# Patient Record
Sex: MALE | Race: WHITE | Employment: OTHER | URBAN - METROPOLITAN AREA
[De-identification: names, ages, dates, MRNs, and addresses within clinical notes are randomized per-mention and may not be internally consistent; named-entity substitution may affect disease eponyms.]

---

## 2017-01-09 ENCOUNTER — OFFICE VISIT (OUTPATIENT)
Dept: PHYSICAL THERAPY | Age: 82
End: 2017-01-09
Payer: MEDICARE

## 2017-01-09 ENCOUNTER — TELEPHONE (OUTPATIENT)
Dept: PHYSICAL THERAPY | Age: 82
End: 2017-01-09

## 2017-01-09 DIAGNOSIS — G91.2 NPH (NORMAL PRESSURE HYDROCEPHALUS) (HCC): Primary | ICD-10-CM

## 2017-01-09 PROCEDURE — 97162 PT EVAL MOD COMPLEX 30 MIN: CPT

## 2017-01-09 PROCEDURE — 97110 THERAPEUTIC EXERCISES: CPT

## 2017-01-09 NOTE — PROGRESS NOTES
FALL SCREEN EVALUATION   Referring Physician: Ms. Luis Fernando CORONAN-FNP   Diagnosis: Normal Pressure Hydrocephalus     Date of Service: 1/9/2017     PATIENT SUMMARY   Milton Collazo is a 80year old y/o male who presents to therapy today seeking r bilaterally. No increase in pain. LE Strength: >4+/5 for all planes assessed of the LEs. LE Flexibility: Moderate restrictions of the bilateral hamstrings and iliopsoas.                  Postural Control:  4-Stage Balance Test:   - Feet together: 10 s Able to smoothly change walking speed without loss of balance or gait deviation. Shows a significant difference in walking speed between normal, fast and slow speeds.    (2)  Mild Impairment: Is able to change speed but demonstrates mild gait deviations, or provided on 1/9/2017. Patient was instructed in and issued a HEP for LE strengthening, proprioception, and balance.     Charges: PT Eval x1, TherEx 1       Total Timed Treatment: 45 min     Total Treatment Time: 45 min     PLAN OF CARE:    Goals: (to be met certify the need for these services furnished under this plan of treatment and while under my care.     X___________________________________________________ Date____________________    Certification From: 1/0/5575  To:4/9/2017

## 2017-01-09 NOTE — PROGRESS NOTES
HPI:    Patient ID: Tianna Prince is a 80year old male.     HPI    Review of Systems         Current Outpatient Prescriptions:  Order #: [id number]   Order #: [id number]   Order #: [id number]   Order #: [id number]   Order #: [id number]   Order #: [id number]   Orde

## 2017-01-16 ENCOUNTER — TELEPHONE (OUTPATIENT)
Dept: NEUROLOGY | Facility: CLINIC | Age: 82
End: 2017-01-16

## 2017-01-16 ENCOUNTER — APPOINTMENT (OUTPATIENT)
Dept: SPEECH THERAPY | Age: 82
End: 2017-01-16
Payer: MEDICARE

## 2017-01-16 ENCOUNTER — APPOINTMENT (OUTPATIENT)
Dept: PHYSICAL THERAPY | Age: 82
End: 2017-01-16
Payer: MEDICARE

## 2017-01-16 NOTE — TELEPHONE ENCOUNTER
Left message for Vinicius Rossidles that patient hasn't been seen since 11/2/15 and would need follow up for new order.

## 2017-01-23 ENCOUNTER — OFFICE VISIT (OUTPATIENT)
Dept: SPEECH THERAPY | Age: 82
End: 2017-01-23
Attending: INTERNAL MEDICINE
Payer: MEDICARE

## 2017-01-23 ENCOUNTER — OFFICE VISIT (OUTPATIENT)
Dept: PHYSICAL THERAPY | Age: 82
End: 2017-01-23
Payer: MEDICARE

## 2017-01-23 DIAGNOSIS — G91.2 NORMAL PRESSURE HYDROCEPHALUS (HCC): Primary | ICD-10-CM

## 2017-01-23 PROCEDURE — 92523 SPEECH SOUND LANG COMPREHEN: CPT

## 2017-01-23 PROCEDURE — 97530 THERAPEUTIC ACTIVITIES: CPT

## 2017-01-23 PROCEDURE — 97116 GAIT TRAINING THERAPY: CPT

## 2017-01-23 PROCEDURE — 97110 THERAPEUTIC EXERCISES: CPT

## 2017-01-23 NOTE — PROGRESS NOTES
Dx: Gait Disturbance secondary Hydrocephalus         Authorized # of Visits:  12         Next MD visit: none scheduled  Fall Risk: standard         Precautions: n/a             Subjective: States he is feeling well. Able to complete most tasks at home.   H Total Timed Treatment: 35 min  Total Treatment Time: 35 min

## 2017-01-30 ENCOUNTER — APPOINTMENT (OUTPATIENT)
Dept: PHYSICAL THERAPY | Age: 82
End: 2017-01-30
Payer: MEDICARE

## 2017-02-01 NOTE — PROGRESS NOTES
ADULT SPEECH/LANGUAGE EVALUATION  Referring Physician: IZABEL Fowler  Diagnosis: Cognitive communication deficit (A44.050) Date of Service: 1/23/2017     PATIENT SUMMARY  Bayron Brown is a 80year old male who presents to therapy toda forgetfulness       Current Outpatient Prescriptions:  Multiple Vitamins-Minerals (PRESERVISION AREDS) Oral Tab Take  by mouth. Disp:  Rfl:    folic acid (FOLVITE) 152 MCG Oral Tab Take 400 mcg by mouth daily.  Disp:  Rfl:    Potassium Chloride ER (K-DUR M2 tools used: Cognitive-Linguistic Quick Test (CLQT)  Severity: Mild-moderate    Attention: 53- Moderate deficits  Memory: 151- WNL  Executive Functions: 12- Moderate deficits   Language: 28- WNL (borderline mild deficits)  Visuospatial Skills: 41- Mild defi organizational tasks with 80% accuracy given min verbal cues. 3. Patient will complete attention (sustained, divided, alternating) tasks with 90% accuracy given min verbal cues.   4, Patient will complete word finding activities (synonyms, opposites, fill-

## 2017-02-06 ENCOUNTER — OFFICE VISIT (OUTPATIENT)
Dept: PHYSICAL THERAPY | Age: 82
End: 2017-02-06
Payer: MEDICARE

## 2017-02-06 ENCOUNTER — OFFICE VISIT (OUTPATIENT)
Dept: SPEECH THERAPY | Age: 82
End: 2017-02-06
Attending: INTERNAL MEDICINE
Payer: MEDICARE

## 2017-02-06 PROCEDURE — 97116 GAIT TRAINING THERAPY: CPT

## 2017-02-06 PROCEDURE — 92507 TX SP LANG VOICE COMM INDIV: CPT

## 2017-02-06 PROCEDURE — 97530 THERAPEUTIC ACTIVITIES: CPT

## 2017-02-06 PROCEDURE — 97110 THERAPEUTIC EXERCISES: CPT

## 2017-02-06 NOTE — PROGRESS NOTES
Treatment # . MEDICARE: 2, 2# since last progress note.  Treatment Time:60 minutes  Precautions: Fall Risk      Charges: 1 billed ([id number])   Pain: 0/10       Diagnosis: Cognitive communication deficit (R41.841)          Subjective: Patient arrived to therapy

## 2017-02-06 NOTE — PROGRESS NOTES
Dx: Gait Disturbance secondary Hydrocephalus         Authorized # of Visits:  12         Next MD visit: none scheduled  Fall Risk: standard         Precautions: n/a             Subjective: States he is feeling well. Able to complete most tasks at home.   H VCs.                  Therapeutic Activity Therapeutic Activity        Tandem walk (HEP) Tandem walk (HEP)        Carioca (HEP) Carioca (HEP)        Sit to Stand x 10 Sit to Stand x 10                                   Skilled Services:  All exercise skille

## 2017-02-09 ENCOUNTER — PRIOR ORIGINAL RECORDS (OUTPATIENT)
Dept: OTHER | Age: 82
End: 2017-02-09

## 2017-02-13 ENCOUNTER — APPOINTMENT (OUTPATIENT)
Dept: SPEECH THERAPY | Age: 82
End: 2017-02-13
Payer: MEDICARE

## 2017-02-13 ENCOUNTER — APPOINTMENT (OUTPATIENT)
Dept: PHYSICAL THERAPY | Age: 82
End: 2017-02-13
Payer: MEDICARE

## 2017-02-20 ENCOUNTER — OFFICE VISIT (OUTPATIENT)
Dept: SPEECH THERAPY | Age: 82
End: 2017-02-20
Attending: INTERNAL MEDICINE
Payer: MEDICARE

## 2017-02-20 ENCOUNTER — OFFICE VISIT (OUTPATIENT)
Dept: PHYSICAL THERAPY | Age: 82
End: 2017-02-20
Payer: MEDICARE

## 2017-02-20 PROCEDURE — 97530 THERAPEUTIC ACTIVITIES: CPT

## 2017-02-20 PROCEDURE — 97110 THERAPEUTIC EXERCISES: CPT

## 2017-02-20 PROCEDURE — 97116 GAIT TRAINING THERAPY: CPT

## 2017-02-20 PROCEDURE — 92507 TX SP LANG VOICE COMM INDIV: CPT

## 2017-02-20 NOTE — PROGRESS NOTES
Dx: Gait Disturbance secondary Hydrocephalus         Authorized # of Visits:  12         Next MD visit: none scheduled  Fall Risk: standard         Precautions: n/a             Subjective: Feeling more stable with his walking and with the initiation of wal terrains with SBA and min VCs. Patient ambulate on various terrains with SBA and min VCs.                  Therapeutic Activity Therapeutic Activity Therapeutic Activity(25 min)       Tandem walk (HEP) Tandem walk (HEP) Tandem walk (HEP)       Carioca (HEP

## 2017-02-21 NOTE — PROGRESS NOTES
Treatment # . MEDICARE: 3, 3# since last progress note.  Treatment Time:60 minutes  Precautions: Fall Risk      Charges: 1 billed ([id number])   Pain: 0/10       Diagnosis: Cognitive communication deficit (R41.841)          Subjective: Patient arrived to therapy

## 2017-02-22 ENCOUNTER — PRIOR ORIGINAL RECORDS (OUTPATIENT)
Dept: OTHER | Age: 82
End: 2017-02-22

## 2017-02-27 ENCOUNTER — APPOINTMENT (OUTPATIENT)
Dept: SPEECH THERAPY | Age: 82
End: 2017-02-27
Payer: MEDICARE

## 2017-02-27 ENCOUNTER — TELEPHONE (OUTPATIENT)
Dept: PHYSICAL THERAPY | Age: 82
End: 2017-02-27

## 2017-02-27 ENCOUNTER — APPOINTMENT (OUTPATIENT)
Dept: PHYSICAL THERAPY | Age: 82
End: 2017-02-27
Payer: MEDICARE

## 2017-03-06 ENCOUNTER — TELEPHONE (OUTPATIENT)
Dept: PHYSICAL THERAPY | Age: 82
End: 2017-03-06

## 2017-03-13 ENCOUNTER — TELEPHONE (OUTPATIENT)
Dept: PHYSICAL THERAPY | Age: 82
End: 2017-03-13

## 2017-03-20 ENCOUNTER — OFFICE VISIT (OUTPATIENT)
Dept: SPEECH THERAPY | Age: 82
End: 2017-03-20
Attending: INTERNAL MEDICINE
Payer: MEDICARE

## 2017-03-20 PROCEDURE — 92507 TX SP LANG VOICE COMM INDIV: CPT

## 2017-03-20 NOTE — PROGRESS NOTES
Treatment # . MEDICARE: 4, 4# since last progress note.  Treatment Time:60 minutes  Precautions: Fall Risk      Charges: 1 billed ([id number])   Pain: 0/10       Diagnosis: Cognitive communication deficit (R41.841)          Subjective: Patient arrived to therapy characterized by decreased attention and reasoning. He continues to struggle as the decreased attention negatively affects his performance with many ADL activities.  He does continues to be able to hold conversation however demonstrates the use of short and

## 2017-03-27 ENCOUNTER — OFFICE VISIT (OUTPATIENT)
Dept: SPEECH THERAPY | Age: 82
End: 2017-03-27
Attending: INTERNAL MEDICINE
Payer: MEDICARE

## 2017-03-27 PROCEDURE — 92507 TX SP LANG VOICE COMM INDIV: CPT

## 2017-03-27 NOTE — PROGRESS NOTES
Treatment # . MEDICARE: 5, 5# since last progress note.  Treatment Time:60 minutes  Precautions: Fall Risk      Charges: 1 billed ([id number])   Pain: 0/10       Diagnosis: Cognitive communication deficit (R41.841)          Subjective: Patient arrived to therapy cues to reinforce the importance of the therapy tasks. He did however, demonstrate improved skills since last week with his pill sorting with regards to reasoning through the required steps to complete the task. Plan: Continue treatment plan.   Facilitat

## 2017-04-03 ENCOUNTER — OFFICE VISIT (OUTPATIENT)
Dept: SPEECH THERAPY | Age: 82
End: 2017-04-03
Attending: INTERNAL MEDICINE
Payer: MEDICARE

## 2017-04-03 PROCEDURE — 92507 TX SP LANG VOICE COMM INDIV: CPT

## 2017-04-03 NOTE — PROGRESS NOTES
Treatment # . MEDICARE: 6, 6# since last progress note.  Treatment Time:60 minutes  Precautions: Fall Risk      Charges: 1 billed ([id number])   Pain: 0/10       Diagnosis: Cognitive communication deficit (R41.841)          Subjective: Patient arrived to therapy Word finding (cross word puzzles): 100% given min verbal cues     4. Patient will compete reasoning and problem solving activities with 90% accuracy given min verbal cues.    Reasoning (pill sorting): 65% patient continues to need mod-max verbal and visu

## 2017-04-10 ENCOUNTER — TELEPHONE (OUTPATIENT)
Dept: PHYSICAL THERAPY | Age: 82
End: 2017-04-10

## 2017-04-10 ENCOUNTER — APPOINTMENT (OUTPATIENT)
Dept: SPEECH THERAPY | Age: 82
End: 2017-04-10
Payer: MEDICARE

## 2017-04-17 ENCOUNTER — OFFICE VISIT (OUTPATIENT)
Dept: SPEECH THERAPY | Age: 82
End: 2017-04-17
Attending: Other
Payer: MEDICARE

## 2017-04-17 PROCEDURE — 92507 TX SP LANG VOICE COMM INDIV: CPT

## 2017-04-17 NOTE — PROGRESS NOTES
Treatment # . MEDICARE: 7, 7# since last progress note.  Treatment Time:60 minutes  Precautions: Fall Risk      Charges: 1 billed ([id number])   Pain: 0/10       Diagnosis: Cognitive communication deficit (R41.841)          Subjective: Patient arrived to therapy present with cognitive communicative deficits characterized by decreased attention and reasoning. Deficits in attention greatly impact his performance. Patient presented with increasedmotivation today which helps to improve his attention to therapy tasks.

## 2017-04-26 ENCOUNTER — PRIOR ORIGINAL RECORDS (OUTPATIENT)
Dept: OTHER | Age: 82
End: 2017-04-26

## 2017-05-05 ENCOUNTER — PRIOR ORIGINAL RECORDS (OUTPATIENT)
Dept: OTHER | Age: 82
End: 2017-05-05

## 2017-08-28 ENCOUNTER — PRIOR ORIGINAL RECORDS (OUTPATIENT)
Dept: OTHER | Age: 82
End: 2017-08-28

## 2017-12-01 ENCOUNTER — PRIOR ORIGINAL RECORDS (OUTPATIENT)
Dept: OTHER | Age: 82
End: 2017-12-01

## 2018-07-19 ENCOUNTER — MYAURORA ACCOUNT LINK (OUTPATIENT)
Dept: OTHER | Age: 83
End: 2018-07-19

## 2018-07-19 ENCOUNTER — PRIOR ORIGINAL RECORDS (OUTPATIENT)
Dept: OTHER | Age: 83
End: 2018-07-19

## 2018-07-25 ENCOUNTER — PRIOR ORIGINAL RECORDS (OUTPATIENT)
Dept: OTHER | Age: 83
End: 2018-07-25

## 2018-07-26 ENCOUNTER — PRIOR ORIGINAL RECORDS (OUTPATIENT)
Dept: OTHER | Age: 83
End: 2018-07-26

## 2018-07-26 LAB
ALBUMIN: 3.9 G/DL
ALKALINE PHOSPHATATE(ALK PHOS): 51 IU/L
BILIRUBIN TOTAL: 0.7 MG/DL
BUN: 15 MG/DL
CALCIUM: 9.2 MG/DL
CHLORIDE: 103 MEQ/L
CHOLESTEROL, TOTAL: 169 MG/DL
CREATININE, SERUM: 1 MG/DL
GLOBULIN: 2.2 G/DL
GLUCOSE: 110 MG/DL
HDL CHOLESTEROL: 45 MG/DL
HEMATOCRIT: 41 %
HEMOGLOBIN: 13.6 G/DL
LDL CHOLESTEROL: 97 MG/DL
PLATELETS: 261 K/UL
POTASSIUM, SERUM: 4.7 MEQ/L
PROTEIN, TOTAL: 6.1 G/DL
RED BLOOD COUNT: 4.48 X 10-6/U
SGOT (AST): 24 IU/L
SGPT (ALT): 25 IU/L
SODIUM: 139 MEQ/L
TRIGLYCERIDES: 168 MG/DL
WHITE BLOOD COUNT: 6.1 X 10-3/U

## 2018-09-17 ENCOUNTER — PRIOR ORIGINAL RECORDS (OUTPATIENT)
Dept: OTHER | Age: 83
End: 2018-09-17

## 2018-09-17 LAB — INR: 2.3

## 2018-10-12 ENCOUNTER — OFFICE VISIT (OUTPATIENT)
Dept: PHYSICAL THERAPY | Age: 83
End: 2018-10-12
Payer: MEDICARE

## 2018-10-12 PROCEDURE — 97161 PT EVAL LOW COMPLEX 20 MIN: CPT

## 2018-10-12 PROCEDURE — 97530 THERAPEUTIC ACTIVITIES: CPT

## 2018-10-12 NOTE — PROGRESS NOTES
FALL SCREEN EVALUATION   Referring Physician: Dr. Cameron Rain. Diagnosis: Gait and Balance Disturbance.       Date of Service: 10/12/2018     PATIENT SUMMARY   Liliana Vergara is a 80year old y/o male who presents to therapy today with complaints of flexibility.                  Postural Control:  4-Stage Balance Test:   - Feet together: 0 sec   - Modified Tandem:  0 sec   - Tandem Stance: 0 sec Fall Risk: yes   - SLS: R 2 sec, L 4 sec Fall Risk: yes  [Full tandem stance <10 sec indicates increased ris speed but demonstrates mild gait deviations, or no gait deviations but unable to achieve a significant change in velocity, or uses an assistive device.    (1)  Moderate Impairment: Makes only minor adjustments to walking speed, or accomplishes a change in s Goals: (to be met in 12 visits)  · Pt will demonstrate improved SLS to >5 seconds DUSTY to promote safety and decrease risk of falls on uneven surfaces such as grass  · Pt will perform TUG in <12 seconds with least restrictive AD, demonstrating improved ga care.    X___________________________________________________ Date____________________    Certification From: 67/09/0369  To:1/10/2019

## 2018-10-15 ENCOUNTER — OFFICE VISIT (OUTPATIENT)
Dept: PHYSICAL THERAPY | Age: 83
End: 2018-10-15
Payer: MEDICARE

## 2018-10-15 DIAGNOSIS — R26.81 GAIT INSTABILITY: ICD-10-CM

## 2018-10-15 PROCEDURE — 97112 NEUROMUSCULAR REEDUCATION: CPT

## 2018-10-15 PROCEDURE — 97110 THERAPEUTIC EXERCISES: CPT

## 2018-10-15 NOTE — PROGRESS NOTES
Dx: Gait Instability         Authorized # of Visits:  12 (Medicare)         Next MD visit: none scheduled  Fall Risk: standard         Precautions: n/a             Subjective: States that he is doing better.       Objective: Treatment initiated per treatmen

## 2018-10-17 ENCOUNTER — PRIOR ORIGINAL RECORDS (OUTPATIENT)
Dept: OTHER | Age: 83
End: 2018-10-17

## 2018-10-17 LAB — INR: 2

## 2018-10-22 ENCOUNTER — OFFICE VISIT (OUTPATIENT)
Dept: PHYSICAL THERAPY | Age: 83
End: 2018-10-22
Attending: Other
Payer: MEDICARE

## 2018-10-22 PROCEDURE — 97112 NEUROMUSCULAR REEDUCATION: CPT

## 2018-10-22 PROCEDURE — 97110 THERAPEUTIC EXERCISES: CPT

## 2018-10-22 NOTE — PROGRESS NOTES
Dx: Gait Instability         Authorized # of Visits:  12 (Medicare)         Next MD visit: none scheduled  Fall Risk: standard         Precautions: n/a             Subjective: States that he is sore today. States that his legs feel tight.       Objective: treatment and progress as tolerated. Charges: TherEx 2 (25 min);  Neuro Rob 1 (15 min)       Total Timed Treatment: 40 min  Total Treatment Time: 40 min

## 2018-10-24 ENCOUNTER — OFFICE VISIT (OUTPATIENT)
Dept: PHYSICAL THERAPY | Age: 83
End: 2018-10-24
Payer: MEDICARE

## 2018-10-24 PROCEDURE — 97112 NEUROMUSCULAR REEDUCATION: CPT

## 2018-10-24 PROCEDURE — 97110 THERAPEUTIC EXERCISES: CPT

## 2018-10-24 NOTE — PROGRESS NOTES
Dx: Gait Instability         Authorized # of Visits:  12 (Medicare)         Next MD visit: none scheduled  Fall Risk: standard         Precautions: n/a             Subjective: States that the exercise is pushing him physically.   Is sore generally and has h strength to demonstrate ability to ascend/descend 1 flight of stairs reciprocally without use of handrail  · Pt will be independent and compliant with comprehensive HEP to maintain progress achieved in PT    Plan: Assess response to treatment and progress

## 2018-10-29 ENCOUNTER — OFFICE VISIT (OUTPATIENT)
Dept: PHYSICAL THERAPY | Age: 83
End: 2018-10-29
Payer: MEDICARE

## 2018-10-29 PROCEDURE — 97112 NEUROMUSCULAR REEDUCATION: CPT

## 2018-10-29 PROCEDURE — 97110 THERAPEUTIC EXERCISES: CPT

## 2018-10-29 NOTE — PROGRESS NOTES
Dx: Gait Instability         Authorized # of Visits:  12 (Medicare)         Next MD visit: none scheduled  Fall Risk: standard         Precautions: n/a             Subjective: States that he has \"a few aches,\" but otherwise good.       Objective: Treatmen will be able to squat to  light objects around the house without loss of stability  · Pt will improve functional hip strength to demonstrate ability to ascend/descend 1 flight of stairs reciprocally without use of handrail  · Pt will be independent

## 2018-10-31 ENCOUNTER — OFFICE VISIT (OUTPATIENT)
Dept: PHYSICAL THERAPY | Age: 83
End: 2018-10-31
Payer: MEDICARE

## 2018-10-31 PROCEDURE — 97110 THERAPEUTIC EXERCISES: CPT

## 2018-10-31 PROCEDURE — 97112 NEUROMUSCULAR REEDUCATION: CPT

## 2018-10-31 NOTE — PROGRESS NOTES
Dx: Gait Instability         Authorized # of Visits:  12 (Medicare)         Next MD visit: none scheduled  Fall Risk: standard         Precautions: n/a             Subjective: Some lumbar pain today. Objective: Treatment progressed per treatment log. community ambulation  · Pt will perform 4-Item DGI with score of 10/12 or greater with least restrictive AD to demonstrate ability to ambulate safely in crowded and busy environments  · Pt will be able to squat to  light objects around the house wit

## 2018-11-06 ENCOUNTER — OFFICE VISIT (OUTPATIENT)
Dept: PHYSICAL THERAPY | Age: 83
End: 2018-11-06
Payer: MEDICARE

## 2018-11-06 PROCEDURE — 97112 NEUROMUSCULAR REEDUCATION: CPT

## 2018-11-06 PROCEDURE — 97110 THERAPEUTIC EXERCISES: CPT

## 2018-11-06 NOTE — PROGRESS NOTES
Dx: Gait Instability         Authorized # of Visits:  12 (Medicare)         Next MD visit: none scheduled  Fall Risk: standard         Precautions: n/a             Subjective: Feeling better today. More mobility since last session.   Feels more stable on h <12 seconds with least restrictive AD, demonstrating improved gait speed for improved participation in ADL such as community ambulation (progressing)  · Pt will perform 4-Item DGI with score of 10/12 or greater with least restrictive AD to demonstrate abil

## 2018-11-16 ENCOUNTER — OFFICE VISIT (OUTPATIENT)
Dept: PHYSICAL THERAPY | Age: 83
End: 2018-11-16
Payer: MEDICARE

## 2018-11-16 ENCOUNTER — PRIOR ORIGINAL RECORDS (OUTPATIENT)
Dept: OTHER | Age: 83
End: 2018-11-16

## 2018-11-16 LAB — INR: 2.1

## 2018-11-16 PROCEDURE — 97112 NEUROMUSCULAR REEDUCATION: CPT

## 2018-11-16 PROCEDURE — 97110 THERAPEUTIC EXERCISES: CPT

## 2018-11-16 NOTE — PROGRESS NOTES
Dx: Gait Instability         Authorized # of Visits:  12 (Medicare)         Next MD visit: none scheduled  Fall Risk: standard         Precautions: n/a             Subjective: Feeling better today. More mobility since last session.   Feels more stable on h exercise. Endurance for activity has improved altogether - no rest breaks taken with all cardio exercises today.       Goals: (to be met in 12 visits)  · Pt will demonstrate improved SLS to >5 seconds DUSTY to promote safety and decrease risk of falls on une

## 2018-11-28 ENCOUNTER — OFFICE VISIT (OUTPATIENT)
Dept: PHYSICAL THERAPY | Age: 83
End: 2018-11-28
Payer: MEDICARE

## 2018-11-28 PROCEDURE — 97110 THERAPEUTIC EXERCISES: CPT

## 2018-11-28 PROCEDURE — 97112 NEUROMUSCULAR REEDUCATION: CPT

## 2018-11-28 NOTE — PROGRESS NOTES
Dx: Gait Instability         Authorized # of Visits:  12 (Medicare)         Next MD visit: none scheduled  Fall Risk: standard         Precautions: n/a             Subjective: Feeling better today. Had more mobility while in [place].       Objective: Treatment // Bars red x       Mini Squats x 20 Mini Squats x 20 Mini Squats x 20 Mini Squats x 20      Heel raises x 20 Heel raises x 20 Heel raises x 20 Heel raises x 20              Assessment: Tolerated treatment well with increased tolerance for exercise.   Endur

## 2018-11-30 ENCOUNTER — APPOINTMENT (OUTPATIENT)
Dept: PHYSICAL THERAPY | Age: 83
End: 2018-11-30
Payer: MEDICARE

## 2018-12-05 ENCOUNTER — OFFICE VISIT (OUTPATIENT)
Dept: PHYSICAL THERAPY | Age: 83
End: 2018-12-05
Payer: MEDICARE

## 2018-12-05 PROCEDURE — 97110 THERAPEUTIC EXERCISES: CPT

## 2018-12-05 NOTE — PROGRESS NOTES
Dx: Gait Instability         Authorized # of Visits:  12 (Medicare)         Next MD visit: none scheduled  Fall Risk: standard         Precautions: n/a             Subjective: Feeling better today. Has been moving around pretty well as of late.   States th Assessment: Tolerated treatment well with increased tolerance for exercise. Gait step/stride length is main limiting factor.        Goals: (to be met in 12 visits)  · Pt will demonstrate improved SLS to >5 seconds DUSTY to promote safety and decrease ris

## 2018-12-10 ENCOUNTER — PRIOR ORIGINAL RECORDS (OUTPATIENT)
Dept: OTHER | Age: 83
End: 2018-12-10

## 2018-12-10 ENCOUNTER — APPOINTMENT (OUTPATIENT)
Dept: PHYSICAL THERAPY | Age: 83
End: 2018-12-10
Payer: MEDICARE

## 2018-12-10 LAB — INR: 2.4

## 2018-12-14 ENCOUNTER — OFFICE VISIT (OUTPATIENT)
Dept: PHYSICAL THERAPY | Age: 83
End: 2018-12-14
Payer: MEDICARE

## 2018-12-14 PROCEDURE — 97140 MANUAL THERAPY 1/> REGIONS: CPT

## 2018-12-14 PROCEDURE — 97110 THERAPEUTIC EXERCISES: CPT

## 2018-12-14 NOTE — PROGRESS NOTES
Dx: Gait Instability         Authorized # of Visits:  12 (Medicare)         Next MD visit: none scheduled  Fall Risk: standard         Precautions: n/a             Subjective: Feeling better today. Improved functional movement.       Objective: Treatment p 20 Mini Squats x 20 Mini Squats x 20 Mini Squats x 20 Mini Squats x 20 Mini Squats x 20   Heel raises x 20 Heel raises x 20 Heel raises x 20 Heel raises x 20 Heel raises x 20 Heel raises x 20             Assessment: Tolerated treatment well with increased

## 2018-12-17 ENCOUNTER — PRIOR ORIGINAL RECORDS (OUTPATIENT)
Dept: OTHER | Age: 83
End: 2018-12-17

## 2018-12-17 LAB — INR: 2.4

## 2018-12-24 ENCOUNTER — PRIOR ORIGINAL RECORDS (OUTPATIENT)
Dept: OTHER | Age: 83
End: 2018-12-24

## 2018-12-24 LAB — INR: 2.7

## 2018-12-31 ENCOUNTER — PRIOR ORIGINAL RECORDS (OUTPATIENT)
Dept: OTHER | Age: 83
End: 2018-12-31

## 2018-12-31 LAB — INR: 2.5

## 2019-01-01 ENCOUNTER — ANTI-COAG (OUTPATIENT)
Dept: CARDIOLOGY | Age: 84
End: 2019-01-01

## 2019-01-01 ENCOUNTER — CLINICAL ABSTRACT (OUTPATIENT)
Dept: CARDIOLOGY | Age: 84
End: 2019-01-01

## 2019-01-01 ENCOUNTER — OFFICE VISIT (OUTPATIENT)
Dept: INTERNAL MEDICINE CLINIC | Facility: CLINIC | Age: 84
End: 2019-01-01
Payer: MEDICARE

## 2019-01-01 ENCOUNTER — TELEPHONE (OUTPATIENT)
Dept: CARDIOLOGY | Age: 84
End: 2019-01-01

## 2019-01-01 ENCOUNTER — TELEPHONE (OUTPATIENT)
Dept: NEUROLOGY | Facility: CLINIC | Age: 84
End: 2019-01-01

## 2019-01-01 ENCOUNTER — PATIENT OUTREACH (OUTPATIENT)
Dept: CASE MANAGEMENT | Age: 84
End: 2019-01-01

## 2019-01-01 ENCOUNTER — APPOINTMENT (OUTPATIENT)
Dept: ULTRASOUND IMAGING | Facility: HOSPITAL | Age: 84
End: 2019-01-01
Attending: Other
Payer: MEDICARE

## 2019-01-01 ENCOUNTER — OFFICE VISIT (OUTPATIENT)
Dept: CARDIOLOGY | Age: 84
End: 2019-01-01

## 2019-01-01 ENCOUNTER — HOSPITAL ENCOUNTER (OUTPATIENT)
Facility: HOSPITAL | Age: 84
Setting detail: OBSERVATION
Discharge: HOME | End: 2019-01-01
Attending: EMERGENCY MEDICINE | Admitting: HOSPITALIST
Payer: MEDICARE

## 2019-01-01 ENCOUNTER — APPOINTMENT (OUTPATIENT)
Dept: CT IMAGING | Facility: HOSPITAL | Age: 84
End: 2019-01-01
Attending: EMERGENCY MEDICINE
Payer: MEDICARE

## 2019-01-01 VITALS
DIASTOLIC BLOOD PRESSURE: 70 MMHG | BODY MASS INDEX: 29.54 KG/M2 | HEIGHT: 71 IN | WEIGHT: 211 LBS | SYSTOLIC BLOOD PRESSURE: 138 MMHG | HEART RATE: 58 BPM

## 2019-01-01 VITALS
WEIGHT: 210 LBS | OXYGEN SATURATION: 98 % | SYSTOLIC BLOOD PRESSURE: 133 MMHG | HEART RATE: 55 BPM | TEMPERATURE: 98 F | RESPIRATION RATE: 15 BRPM | BODY MASS INDEX: 30.06 KG/M2 | HEIGHT: 70 IN | DIASTOLIC BLOOD PRESSURE: 52 MMHG

## 2019-01-01 VITALS
HEART RATE: 56 BPM | SYSTOLIC BLOOD PRESSURE: 150 MMHG | TEMPERATURE: 99 F | WEIGHT: 211.88 LBS | DIASTOLIC BLOOD PRESSURE: 62 MMHG | OXYGEN SATURATION: 98 % | RESPIRATION RATE: 18 BRPM | BODY MASS INDEX: 30 KG/M2

## 2019-01-01 VITALS
DIASTOLIC BLOOD PRESSURE: 72 MMHG | HEIGHT: 71 IN | WEIGHT: 214 LBS | HEART RATE: 68 BPM | BODY MASS INDEX: 29.96 KG/M2 | SYSTOLIC BLOOD PRESSURE: 154 MMHG

## 2019-01-01 DIAGNOSIS — I48.91 ATRIAL FIBRILLATION, UNSPECIFIED TYPE (CMD): ICD-10-CM

## 2019-01-01 DIAGNOSIS — G45.9 TIA (TRANSIENT ISCHEMIC ATTACK): ICD-10-CM

## 2019-01-01 DIAGNOSIS — E78.5 HYPERLIPIDEMIA, UNSPECIFIED HYPERLIPIDEMIA TYPE: Chronic | ICD-10-CM

## 2019-01-01 DIAGNOSIS — I48.20 CHRONIC ATRIAL FIBRILLATION (CMD): ICD-10-CM

## 2019-01-01 DIAGNOSIS — Z86.73 HISTORY OF TRANSIENT ISCHEMIC ATTACK (TIA): Primary | ICD-10-CM

## 2019-01-01 DIAGNOSIS — E78.00 PURE HYPERCHOLESTEROLEMIA: ICD-10-CM

## 2019-01-01 DIAGNOSIS — I48.91 ATRIAL FIBRILLATION, UNSPECIFIED TYPE (HCC): ICD-10-CM

## 2019-01-01 DIAGNOSIS — G91.2 NPH (NORMAL PRESSURE HYDROCEPHALUS) (HCC): ICD-10-CM

## 2019-01-01 DIAGNOSIS — Z95.2 HISTORY OF MITRAL VALVE REPLACEMENT: Primary | ICD-10-CM

## 2019-01-01 DIAGNOSIS — I10 HYPERTENSION, UNSPECIFIED TYPE: Chronic | ICD-10-CM

## 2019-01-01 DIAGNOSIS — G45.9 TIA (TRANSIENT ISCHEMIC ATTACK): Primary | ICD-10-CM

## 2019-01-01 DIAGNOSIS — I48.0 PAROXYSMAL ATRIAL FIBRILLATION (CMD): ICD-10-CM

## 2019-01-01 DIAGNOSIS — Z95.2 HISTORY OF MITRAL VALVE REPLACEMENT: ICD-10-CM

## 2019-01-01 DIAGNOSIS — Z79.01 ANTICOAGULATED ON COUMADIN: ICD-10-CM

## 2019-01-01 DIAGNOSIS — Z02.9 ENCOUNTERS FOR UNSPECIFIED ADMINISTRATIVE PURPOSE: ICD-10-CM

## 2019-01-01 DIAGNOSIS — I10 HYPERTENSION, BENIGN: ICD-10-CM

## 2019-01-01 DIAGNOSIS — G31.84 MILD COGNITIVE IMPAIRMENT: ICD-10-CM

## 2019-01-01 DIAGNOSIS — I10 MALIGNANT HYPERTENSION: Primary | ICD-10-CM

## 2019-01-01 DIAGNOSIS — I10 ESSENTIAL HYPERTENSION: Chronic | ICD-10-CM

## 2019-01-01 DIAGNOSIS — I25.10 CORONARY ARTERY DISEASE INVOLVING NATIVE CORONARY ARTERY OF NATIVE HEART WITHOUT ANGINA PECTORIS: ICD-10-CM

## 2019-01-01 DIAGNOSIS — I73.9 PVD (PERIPHERAL VASCULAR DISEASE) (HCC): ICD-10-CM

## 2019-01-01 DIAGNOSIS — I63.9 CEREBROVASCULAR ACCIDENT (CVA), UNSPECIFIED MECHANISM (HCC): ICD-10-CM

## 2019-01-01 DIAGNOSIS — I34.0 MITRAL VALVE INSUFFICIENCY, UNSPECIFIED ETIOLOGY: ICD-10-CM

## 2019-01-01 LAB
ABSOLUTE IMMATURE GRANULOCYTES (OFFPRE24): NORMAL
ALBUMIN SERPL-MCNC: 3.6 G/DL (ref 3.4–5)
ALBUMIN SERPL-MCNC: 4 G/DL
ALBUMIN SERPL-MCNC: 4 G/DL (ref 3.6–5.1)
ALBUMIN/GLOB SERPL: 1.2 {RATIO} (ref 1–2)
ALBUMIN/GLOB SERPL: 1.9 (CALC) (ref 1–2.5)
ALBUMIN/GLOB SERPL: 1.9 {RATIO}
ALP LIVER SERPL-CCNC: 56 U/L (ref 45–117)
ALP SERPL-CCNC: 49 U/L
ALP SERPL-CCNC: 49 U/L (ref 40–115)
ALT SERPL-CCNC: 28 U/L
ALT SERPL-CCNC: 28 U/L (ref 9–46)
ALT SERPL-CCNC: 35 U/L (ref 16–61)
ANION GAP SERPL CALC-SCNC: 6 MMOL/L (ref 0–18)
ANION GAP SERPL CALC-SCNC: NORMAL MMOL/L
APTT PPP: 37.4 SECONDS (ref 25.4–36.1)
ASPIRIN PLT INHIBITION: 446 ARU (ref 620–672)
AST SERPL-CCNC: 27 U/L
AST SERPL-CCNC: 27 U/L (ref 10–35)
AST SERPL-CCNC: 35 U/L (ref 15–37)
ATRIAL RATE: 72 BPM
BASO+EOS+MONOS # BLD: NORMAL 10*3/UL
BASO+EOS+MONOS NFR BLD: NORMAL %
BASOPHILS # BLD AUTO: 0.03 X10(3) UL (ref 0–0.2)
BASOPHILS # BLD AUTO: 39 CELLS/UL (ref 0–200)
BASOPHILS # BLD: NORMAL 10*3/UL
BASOPHILS NFR BLD AUTO: 0.5 %
BASOPHILS NFR BLD AUTO: 0.6 %
BASOPHILS NFR BLD: NORMAL %
BILIRUB SERPL-MCNC: 0.6 MG/DL (ref 0.1–2)
BILIRUB SERPL-MCNC: 0.7 MG/DL
BILIRUB SERPL-MCNC: 0.7 MG/DL (ref 0.2–1.2)
BILIRUB UR QL STRIP.AUTO: NEGATIVE
BUN BLD-MCNC: 19 MG/DL (ref 7–18)
BUN SERPL-MCNC: 16 MG/DL
BUN SERPL-MCNC: 16 MG/DL (ref 7–25)
BUN/CREAT SERPL: 18.6 (ref 10–20)
BUN/CREAT SERPL: ABNORMAL (CALC) (ref 6–22)
BUN/CREAT SERPL: NORMAL
CALCIUM BLD-MCNC: 8.9 MG/DL (ref 8.5–10.1)
CALCIUM SERPL-MCNC: 9 MG/DL
CALCIUM SERPL-MCNC: 9 MG/DL (ref 8.6–10.3)
CHLORIDE SERPL-SCNC: 103 MMOL/L
CHLORIDE SERPL-SCNC: 103 MMOL/L (ref 98–110)
CHLORIDE SERPL-SCNC: 106 MMOL/L (ref 98–112)
CHOLEST SERPL-MCNC: 177 MG/DL
CHOLEST SERPL-MCNC: 177 MG/DL
CHOLEST SMN-MCNC: 154 MG/DL (ref ?–200)
CHOLEST/HDLC SERPL: 3.8 (CALC)
CHOLEST/HDLC SERPL: NORMAL {RATIO}
CLARITY UR REFRACT.AUTO: CLEAR
CO2 SERPL-SCNC: 28 MMOL/L (ref 21–32)
CO2 SERPL-SCNC: 30 MMOL/L
CO2 SERPL-SCNC: 30 MMOL/L (ref 20–32)
COLOR UR AUTO: YELLOW
CREAT BLD-MCNC: 1.02 MG/DL (ref 0.7–1.3)
CREAT SERPL-MCNC: 0.96 MG/DL
CREAT SERPL-MCNC: 0.96 MG/DL (ref 0.7–1.11)
DEPRECATED RDW RBC AUTO: 45.7 FL (ref 35.1–46.3)
DIFFERENTIAL METHOD BLD: NORMAL
EOSINOPHIL # BLD AUTO: 0.21 X10(3) UL (ref 0–0.7)
EOSINOPHIL # BLD AUTO: 377 CELLS/UL (ref 15–500)
EOSINOPHIL # BLD: NORMAL 10*3/UL
EOSINOPHIL NFR BLD AUTO: 3.2 %
EOSINOPHIL NFR BLD AUTO: 5.8 %
EOSINOPHIL NFR BLD: NORMAL %
ERYTHROCYTE [DISTWIDTH] IN BLOOD BY AUTOMATED COUNT: 13.5 % (ref 11–15)
ERYTHROCYTE [DISTWIDTH] IN BLOOD BY AUTOMATED COUNT: 13.7 % (ref 11–15)
ERYTHROCYTE [DISTWIDTH] IN BLOOD: NORMAL %
GFRSERPLBLD MDRD-ARVRAT: 71 ML/MIN/1.73M2
GLOBULIN PLAS-MCNC: 3 G/DL (ref 2.8–4.4)
GLOBULIN SER CALC-MCNC: 2.1 G/DL (CALC) (ref 1.9–3.7)
GLOBULIN SER-MCNC: 2.1 G/DL
GLUCOSE BLD-MCNC: 138 MG/DL (ref 70–99)
GLUCOSE BLD-MCNC: 93 MG/DL (ref 70–99)
GLUCOSE SERPL-MCNC: 106 MG/DL
GLUCOSE SERPL-MCNC: 106 MG/DL (ref 65–99)
GLUCOSE UR STRIP.AUTO-MCNC: NEGATIVE MG/DL
HCT VFR BLD AUTO: 41.5 % (ref 39–53)
HCT VFR BLD AUTO: 42 % (ref 38.5–50)
HCT VFR BLD CALC: 42 %
HDLC SERPL-MCNC: 41 MG/DL (ref 40–59)
HDLC SERPL-MCNC: 47 MG/DL
HDLC SERPL-MCNC: 47 MG/DL
HGB BLD-MCNC: 13.8 G/DL
HGB BLD-MCNC: 13.8 G/DL (ref 13.2–17.1)
HGB BLD-MCNC: 13.9 G/DL (ref 13–17.5)
IMM GRANULOCYTES # BLD AUTO: 0.01 X10(3) UL (ref 0–1)
IMM GRANULOCYTES NFR BLD: 0.2 %
IMMATURE GRANULOCYTES (OFFPRE25): NORMAL
INR BLD: 1.74 (ref 0.9–1.1)
INR BLD: 1.85 (ref 0.9–1.1)
INR PPP: 2
INR PPP: 2
INR PPP: 2.1
INR PPP: 2.2
INR PPP: 2.3
INR PPP: 2.4
INR PPP: 2.5
INR PPP: 2.6
INR PPP: 2.7
INR PPP: 3
KETONES UR STRIP.AUTO-MCNC: NEGATIVE MG/DL
LDLC SERPL CALC-MCNC: 102 MG/DL
LDLC SERPL CALC-MCNC: 102 MG/DL (CALC)
LDLC SERPL CALC-MCNC: 70 MG/DL (ref ?–100)
LENGTH OF FAST TIME PATIENT: NORMAL H
LENGTH OF FAST TIME PATIENT: NORMAL H
LEUKOCYTE ESTERASE UR QL STRIP.AUTO: NEGATIVE
LYMPHOCYTES # BLD AUTO: 1.3 X10(3) UL (ref 1–4)
LYMPHOCYTES # BLD AUTO: 1196 CELLS/UL (ref 850–3900)
LYMPHOCYTES # BLD: NORMAL 10*3/UL
LYMPHOCYTES NFR BLD AUTO: 18.4 %
LYMPHOCYTES NFR BLD AUTO: 19.8 %
LYMPHOCYTES NFR BLD: NORMAL %
M PROTEIN MFR SERPL ELPH: 6.6 G/DL (ref 6.4–8.2)
MCH RBC QN AUTO: 29.9 PG (ref 27–33)
MCH RBC QN AUTO: 30.9 PG (ref 26–34)
MCH RBC QN AUTO: NORMAL PG
MCHC RBC AUTO-ENTMCNC: 32.9 G/DL (ref 32–36)
MCHC RBC AUTO-ENTMCNC: 33.5 G/DL (ref 31–37)
MCHC RBC AUTO-ENTMCNC: NORMAL G/DL
MCV RBC AUTO: 91.1 FL (ref 80–100)
MCV RBC AUTO: 92.2 FL (ref 80–100)
MCV RBC AUTO: NORMAL FL
MONOCYTES # BLD AUTO: 0.76 X10(3) UL (ref 0.1–1)
MONOCYTES # BLD AUTO: 676 CELLS/UL (ref 200–950)
MONOCYTES # BLD: NORMAL 10*3/UL
MONOCYTES NFR BLD AUTO: 10.4 %
MONOCYTES NFR BLD AUTO: 11.6 %
MONOCYTES NFR BLD: NORMAL %
MPV (OFFPRE2): NORMAL
NEUTROPHILS # BLD AUTO: 4.25 X10 (3) UL (ref 1.5–7.7)
NEUTROPHILS # BLD AUTO: 4.25 X10(3) UL (ref 1.5–7.7)
NEUTROPHILS # BLD AUTO: 4212 CELLS/UL (ref 1500–7800)
NEUTROPHILS # BLD: NORMAL 10*3/UL
NEUTROPHILS NFR BLD AUTO: 64.7 %
NEUTROPHILS NFR BLD AUTO: 64.8 %
NEUTROPHILS NFR BLD: NORMAL %
NITRITE UR QL STRIP.AUTO: NEGATIVE
NONHDLC SERPL-MCNC: 113 MG/DL (ref ?–130)
NONHDLC SERPL-MCNC: 130 MG/DL
NONHDLC SERPL-MCNC: 130 MG/DL (CALC)
NRBC BLD MANUAL-RTO: NORMAL %
OSMOLALITY SERPL CALC.SUM OF ELEC: 292 MOSM/KG (ref 275–295)
P AXIS: 60 DEGREES
P-R INTERVAL: 184 MS
PH UR STRIP.AUTO: 8 [PH] (ref 4.5–8)
PLAT MORPH BLD: NORMAL
PLATELET # BLD AUTO: 220 10(3)UL (ref 150–450)
PLATELET # BLD AUTO: 271 THOUSAND/UL (ref 140–400)
PLATELET # BLD: 271 10*3/UL
PMV BLD REES-ECKER: 10.7 FL (ref 7.5–12.5)
POTASSIUM SERPL-SCNC: 4.3 MMOL/L (ref 3.5–5.1)
POTASSIUM SERPL-SCNC: 4.6 MMOL/L
POTASSIUM SERPL-SCNC: 4.6 MMOL/L (ref 3.5–5.3)
PROT SERPL-MCNC: 6.1 G/DL
PROT SERPL-MCNC: 6.1 G/DL (ref 6.1–8.1)
PROT UR STRIP.AUTO-MCNC: 30 MG/DL
PSA SERPL DL<=0.01 NG/ML-MCNC: 21.3 SECONDS (ref 12.5–14.7)
PSA SERPL DL<=0.01 NG/ML-MCNC: 22.4 SECONDS (ref 12.5–14.7)
Q-T INTERVAL: 408 MS
QRS DURATION: 100 MS
QTC CALCULATION (BEZET): 446 MS
R AXIS: 10 DEGREES
RBC # BLD AUTO: 4.5 X10(6)UL (ref 3.8–5.8)
RBC # BLD AUTO: 4.61 MILLION/UL (ref 4.2–5.8)
RBC # BLD: 4.61 10*6/UL
RBC MORPH BLD: NORMAL
RBC UR QL AUTO: NEGATIVE
SODIUM SERPL-SCNC: 139 MMOL/L
SODIUM SERPL-SCNC: 139 MMOL/L (ref 135–146)
SODIUM SERPL-SCNC: 140 MMOL/L (ref 136–145)
SP GR UR STRIP.AUTO: 1.01 (ref 1–1.03)
T AXIS: -54 DEGREES
TRIGL SERPL-MCNC: 161 MG/DL
TRIGL SERPL-MCNC: 161 MG/DL
TRIGL SERPL-MCNC: 215 MG/DL (ref 30–149)
TROPONIN I SERPL-MCNC: <0.045 NG/ML (ref ?–0.04)
UROBILINOGEN UR STRIP.AUTO-MCNC: <2 MG/DL
VENTRICULAR RATE: 72 BPM
VLDLC SERPL CALC-MCNC: 43 MG/DL (ref 0–30)
VLDLC SERPL CALC-MCNC: NORMAL MG/DL
WBC # BLD AUTO: 6.5 THOUSAND/UL (ref 3.8–10.8)
WBC # BLD AUTO: 6.6 X10(3) UL (ref 4–11)
WBC # BLD: 6.5 10*3/UL
WBC MORPH BLD: NORMAL

## 2019-01-01 PROCEDURE — 99214 OFFICE O/P EST MOD 30 MIN: CPT | Performed by: INTERNAL MEDICINE

## 2019-01-01 PROCEDURE — 99204 OFFICE O/P NEW MOD 45 MIN: CPT | Performed by: OTHER

## 2019-01-01 PROCEDURE — 93880 EXTRACRANIAL BILAT STUDY: CPT | Performed by: OTHER

## 2019-01-01 PROCEDURE — 1111F DSCHRG MED/CURRENT MED MERGE: CPT

## 2019-01-01 PROCEDURE — 99214 OFFICE O/P EST MOD 30 MIN: CPT | Performed by: CLINICAL NURSE SPECIALIST

## 2019-01-01 PROCEDURE — 99217 OBSERVATION CARE DISCHARGE: CPT | Performed by: HOSPITALIST

## 2019-01-01 PROCEDURE — 99220 INITIAL OBSERVATION CARE,LEVL III: CPT | Performed by: HOSPITALIST

## 2019-01-01 PROCEDURE — 99225 SUBSEQUENT OBSERVATION CARE: CPT | Performed by: HOSPITALIST

## 2019-01-01 PROCEDURE — 99214 OFFICE O/P EST MOD 30 MIN: CPT | Performed by: OTHER

## 2019-01-01 PROCEDURE — 99495 TRANSJ CARE MGMT MOD F2F 14D: CPT | Performed by: CLINICAL NURSE SPECIALIST

## 2019-01-01 PROCEDURE — 70450 CT HEAD/BRAIN W/O DYE: CPT | Performed by: EMERGENCY MEDICINE

## 2019-01-01 RX ORDER — POTASSIUM CHLORIDE 20 MEQ/1
TABLET, EXTENDED RELEASE ORAL
Qty: 15 TABLET | Refills: 8 | Status: SHIPPED | OUTPATIENT
Start: 2019-01-01

## 2019-01-01 RX ORDER — DONEPEZIL HYDROCHLORIDE 5 MG/1
TABLET, FILM COATED ORAL
COMMUNITY
Start: 2018-09-24

## 2019-01-01 RX ORDER — HYDRALAZINE HYDROCHLORIDE 20 MG/ML
10 INJECTION INTRAMUSCULAR; INTRAVENOUS EVERY 6 HOURS PRN
Status: DISCONTINUED | OUTPATIENT
Start: 2019-01-01 | End: 2019-01-01

## 2019-01-01 RX ORDER — LISINOPRIL 20 MG/1
20 TABLET ORAL ONCE
Status: COMPLETED | OUTPATIENT
Start: 2019-01-01 | End: 2019-01-01

## 2019-01-01 RX ORDER — LANOLIN ALCOHOL/MO/W.PET/CERES
400 CREAM (GRAM) TOPICAL
COMMUNITY
End: 2019-01-01 | Stop reason: DRUGHIGH

## 2019-01-01 RX ORDER — LISINOPRIL 40 MG/1
40 TABLET ORAL DAILY
Status: DISCONTINUED | OUTPATIENT
Start: 2019-01-01 | End: 2019-01-01

## 2019-01-01 RX ORDER — ROSUVASTATIN CALCIUM 20 MG/1
20 TABLET, COATED ORAL NIGHTLY
Qty: 30 TABLET | Refills: 0 | Status: SHIPPED | OUTPATIENT
Start: 2019-01-01 | End: 2020-01-01 | Stop reason: CLARIF

## 2019-01-01 RX ORDER — DONEPEZIL HYDROCHLORIDE 5 MG/1
5 TABLET, FILM COATED ORAL NIGHTLY
COMMUNITY
End: 2020-01-01 | Stop reason: CLARIF

## 2019-01-01 RX ORDER — VIT A/VIT C/VIT E/ZINC/COPPER 2148-113
1 TABLET ORAL
COMMUNITY

## 2019-01-01 RX ORDER — LISINOPRIL 40 MG/1
40 TABLET ORAL DAILY
Qty: 90 TABLET | Refills: 2 | Status: SHIPPED | OUTPATIENT
Start: 2019-01-01

## 2019-01-01 RX ORDER — BENAZEPRIL HYDROCHLORIDE 20 MG/1
20 TABLET ORAL
COMMUNITY
Start: 2014-08-28 | End: 2019-01-01 | Stop reason: ALTCHOICE

## 2019-01-01 RX ORDER — WARFARIN SODIUM 5 MG/1
5 TABLET ORAL DAILY
Qty: 90 TABLET | Refills: 0 | Status: SHIPPED | OUTPATIENT
Start: 2019-01-01 | End: 2019-01-01 | Stop reason: SDUPTHER

## 2019-01-01 RX ORDER — TORSEMIDE 5 MG/1
10 TABLET ORAL DAILY
Status: DISCONTINUED | OUTPATIENT
Start: 2019-01-01 | End: 2019-01-01

## 2019-01-01 RX ORDER — TORSEMIDE 20 MG/1
10 TABLET ORAL DAILY
Qty: 30 TABLET | Refills: 0 | Status: SHIPPED | OUTPATIENT
Start: 2019-01-01 | End: 2019-01-01 | Stop reason: CLARIF

## 2019-01-01 RX ORDER — MULTIVIT-MIN/IRON/FOLIC ACID/K 18-600-40
CAPSULE ORAL
COMMUNITY
Start: 2014-10-01

## 2019-01-01 RX ORDER — TORSEMIDE 10 MG/1
10 TABLET ORAL DAILY
COMMUNITY
End: 2020-01-01 | Stop reason: CLARIF

## 2019-01-01 RX ORDER — ONDANSETRON 2 MG/ML
4 INJECTION INTRAMUSCULAR; INTRAVENOUS EVERY 6 HOURS PRN
Status: DISCONTINUED | OUTPATIENT
Start: 2019-01-01 | End: 2019-01-01

## 2019-01-01 RX ORDER — ASPIRIN 81 MG/1
81 TABLET ORAL
COMMUNITY
End: 2020-01-01 | Stop reason: CLARIF

## 2019-01-01 RX ORDER — LISINOPRIL 40 MG/1
40 TABLET ORAL
COMMUNITY
Start: 2019-01-01 | End: 2019-01-01 | Stop reason: SDUPTHER

## 2019-01-01 RX ORDER — MELATONIN
400 DAILY
Status: DISCONTINUED | OUTPATIENT
Start: 2019-01-01 | End: 2019-01-01

## 2019-01-01 RX ORDER — METOCLOPRAMIDE HYDROCHLORIDE 5 MG/ML
10 INJECTION INTRAMUSCULAR; INTRAVENOUS EVERY 8 HOURS PRN
Status: DISCONTINUED | OUTPATIENT
Start: 2019-01-01 | End: 2019-01-01

## 2019-01-01 RX ORDER — HYDRALAZINE HYDROCHLORIDE 20 MG/ML
10 INJECTION INTRAMUSCULAR; INTRAVENOUS ONCE
Status: COMPLETED | OUTPATIENT
Start: 2019-01-01 | End: 2019-01-01

## 2019-01-01 RX ORDER — LISINOPRIL 40 MG/1
40 TABLET ORAL DAILY
Qty: 30 TABLET | Refills: 0 | Status: SHIPPED | OUTPATIENT
Start: 2019-01-01 | End: 2020-01-01 | Stop reason: CLARIF

## 2019-01-01 RX ORDER — WARFARIN SODIUM 5 MG/1
TABLET ORAL
Qty: 90 TABLET | Refills: 0 | Status: SHIPPED | OUTPATIENT
Start: 2019-01-01 | End: 2020-01-01 | Stop reason: SDUPTHER

## 2019-01-01 RX ORDER — LISINOPRIL 20 MG/1
20 TABLET ORAL DAILY
Status: DISCONTINUED | OUTPATIENT
Start: 2019-01-01 | End: 2019-01-01

## 2019-01-01 RX ORDER — SIMVASTATIN 10 MG
10 TABLET ORAL AT BEDTIME
Qty: 30 TABLET | Refills: 1 | Status: SHIPPED | OUTPATIENT
Start: 2019-01-01 | End: 2019-01-01 | Stop reason: ALTCHOICE

## 2019-01-01 RX ORDER — PRAVASTATIN SODIUM 20 MG
20 TABLET ORAL NIGHTLY
Status: DISCONTINUED | OUTPATIENT
Start: 2019-01-01 | End: 2019-01-01

## 2019-01-01 RX ORDER — WARFARIN SODIUM 5 MG/1
5 TABLET ORAL NIGHTLY
Status: DISCONTINUED | OUTPATIENT
Start: 2019-01-01 | End: 2019-01-01

## 2019-01-01 RX ORDER — CYANOCOBALAMIN (VITAMIN B-12) 500 MCG
TABLET ORAL
COMMUNITY

## 2019-01-01 RX ORDER — ASPIRIN 81 MG/1
81 TABLET ORAL
Status: DISCONTINUED | OUTPATIENT
Start: 2019-01-01 | End: 2019-01-01

## 2019-01-01 RX ORDER — HYDRALAZINE HYDROCHLORIDE 20 MG/ML
5 INJECTION INTRAMUSCULAR; INTRAVENOUS ONCE
Status: COMPLETED | OUTPATIENT
Start: 2019-01-01 | End: 2019-01-01

## 2019-01-01 RX ORDER — ACETAMINOPHEN 325 MG/1
650 TABLET ORAL EVERY 6 HOURS PRN
Status: DISCONTINUED | OUTPATIENT
Start: 2019-01-01 | End: 2019-01-01

## 2019-01-01 RX ORDER — ROSUVASTATIN CALCIUM 20 MG/1
20 TABLET, COATED ORAL
COMMUNITY
Start: 2019-01-01 | End: 2019-01-01 | Stop reason: SDUPTHER

## 2019-01-01 RX ORDER — HYDRALAZINE HYDROCHLORIDE 20 MG/ML
10 INJECTION INTRAMUSCULAR; INTRAVENOUS EVERY 4 HOURS PRN
Status: DISCONTINUED | OUTPATIENT
Start: 2019-01-01 | End: 2019-01-01

## 2019-01-01 RX ORDER — ASPIRIN 81 MG/1
81 TABLET ORAL
COMMUNITY
Start: 2014-08-28

## 2019-01-01 RX ORDER — ROSUVASTATIN CALCIUM 20 MG/1
20 TABLET, COATED ORAL DAILY
Qty: 90 TABLET | Refills: 3 | Status: SHIPPED | OUTPATIENT
Start: 2019-01-01

## 2019-01-01 SDOH — HEALTH STABILITY: PHYSICAL HEALTH: ON AVERAGE, HOW MANY MINUTES DO YOU ENGAGE IN EXERCISE AT THIS LEVEL?: 30 MIN

## 2019-01-01 SDOH — HEALTH STABILITY: PHYSICAL HEALTH: ON AVERAGE, HOW MANY DAYS PER WEEK DO YOU ENGAGE IN MODERATE TO STRENUOUS EXERCISE (LIKE A BRISK WALK)?: 5 DAYS

## 2019-01-01 ASSESSMENT — ENCOUNTER SYMPTOMS
COUGH: 0
FEVER: 0
ALLERGIC/IMMUNOLOGIC COMMENTS: NO NEW FOOD ALLERGIES
WEIGHT GAIN: 0
WEIGHT LOSS: 0
HEMATOCHEZIA: 0
CHILLS: 0
HEMOPTYSIS: 0
SUSPICIOUS LESIONS: 0
BRUISES/BLEEDS EASILY: 0

## 2019-01-07 ENCOUNTER — PRIOR ORIGINAL RECORDS (OUTPATIENT)
Dept: OTHER | Age: 84
End: 2019-01-07

## 2019-01-07 LAB — INR: 2.5

## 2019-01-14 ENCOUNTER — PRIOR ORIGINAL RECORDS (OUTPATIENT)
Dept: OTHER | Age: 84
End: 2019-01-14

## 2019-01-14 LAB — INR: 2.7

## 2019-01-21 ENCOUNTER — PRIOR ORIGINAL RECORDS (OUTPATIENT)
Dept: OTHER | Age: 84
End: 2019-01-21

## 2019-01-21 LAB — INR: 2.5

## 2019-01-24 ENCOUNTER — PRIOR ORIGINAL RECORDS (OUTPATIENT)
Dept: OTHER | Age: 84
End: 2019-01-24

## 2019-01-24 ENCOUNTER — MYAURORA ACCOUNT LINK (OUTPATIENT)
Dept: OTHER | Age: 84
End: 2019-01-24

## 2019-01-29 ENCOUNTER — PRIOR ORIGINAL RECORDS (OUTPATIENT)
Dept: OTHER | Age: 84
End: 2019-01-29

## 2019-01-29 LAB — INR: 2.7

## 2019-02-05 ENCOUNTER — PRIOR ORIGINAL RECORDS (OUTPATIENT)
Dept: OTHER | Age: 84
End: 2019-02-05

## 2019-02-05 LAB — INR: 2.8

## 2019-02-12 ENCOUNTER — PRIOR ORIGINAL RECORDS (OUTPATIENT)
Dept: OTHER | Age: 84
End: 2019-02-12

## 2019-02-12 LAB — INR: 3

## 2019-02-19 ENCOUNTER — PRIOR ORIGINAL RECORDS (OUTPATIENT)
Dept: OTHER | Age: 84
End: 2019-02-19

## 2019-02-19 LAB — INR: 2.8

## 2019-02-26 ENCOUNTER — PRIOR ORIGINAL RECORDS (OUTPATIENT)
Dept: OTHER | Age: 84
End: 2019-02-26

## 2019-02-26 LAB — INR: 2.7

## 2019-02-27 ENCOUNTER — ANTI-COAG (OUTPATIENT)
Dept: CARDIOLOGY | Age: 84
End: 2019-02-27

## 2019-02-27 DIAGNOSIS — I48.91 ATRIAL FIBRILLATION, UNSPECIFIED TYPE (CMD): ICD-10-CM

## 2019-02-28 VITALS
SYSTOLIC BLOOD PRESSURE: 154 MMHG | HEIGHT: 71 IN | BODY MASS INDEX: 30.52 KG/M2 | WEIGHT: 218 LBS | DIASTOLIC BLOOD PRESSURE: 66 MMHG | HEART RATE: 54 BPM

## 2019-02-28 VITALS
HEIGHT: 71 IN | HEART RATE: 61 BPM | DIASTOLIC BLOOD PRESSURE: 76 MMHG | WEIGHT: 213 LBS | SYSTOLIC BLOOD PRESSURE: 120 MMHG | BODY MASS INDEX: 29.82 KG/M2

## 2019-02-28 VITALS
WEIGHT: 213 LBS | DIASTOLIC BLOOD PRESSURE: 72 MMHG | HEIGHT: 71 IN | BODY MASS INDEX: 29.82 KG/M2 | HEART RATE: 44 BPM | SYSTOLIC BLOOD PRESSURE: 140 MMHG

## 2019-02-28 VITALS
WEIGHT: 213 LBS | DIASTOLIC BLOOD PRESSURE: 78 MMHG | HEART RATE: 62 BPM | HEIGHT: 71 IN | BODY MASS INDEX: 29.82 KG/M2 | SYSTOLIC BLOOD PRESSURE: 152 MMHG

## 2019-02-28 PROBLEM — I48.91 UNSPECIFIED ATRIAL FIBRILLATION (CMD): Status: ACTIVE | Noted: 2019-02-28

## 2019-03-01 VITALS
WEIGHT: 224 LBS | HEART RATE: 60 BPM | BODY MASS INDEX: 31.36 KG/M2 | HEIGHT: 71 IN | SYSTOLIC BLOOD PRESSURE: 204 MMHG | DIASTOLIC BLOOD PRESSURE: 86 MMHG

## 2019-03-05 ENCOUNTER — ANTI-COAG (OUTPATIENT)
Dept: CARDIOLOGY | Age: 84
End: 2019-03-05

## 2019-03-05 DIAGNOSIS — I48.91 ATRIAL FIBRILLATION, UNSPECIFIED TYPE (CMD): ICD-10-CM

## 2019-03-05 LAB — INR PPP: 3.1

## 2019-03-11 ENCOUNTER — TELEPHONE (OUTPATIENT)
Dept: CARDIOLOGY | Age: 84
End: 2019-03-11

## 2019-03-11 RX ORDER — TORSEMIDE 10 MG/1
10 TABLET ORAL DAILY
Qty: 60 TABLET | Refills: 5 | Status: SHIPPED | OUTPATIENT
Start: 2019-03-11

## 2019-03-11 RX ORDER — TORSEMIDE 10 MG/1
10 TABLET ORAL DAILY
COMMUNITY
End: 2019-03-11 | Stop reason: SDUPTHER

## 2019-03-12 ENCOUNTER — ANTI-COAG (OUTPATIENT)
Dept: CARDIOLOGY | Age: 84
End: 2019-03-12

## 2019-03-12 DIAGNOSIS — I48.91 ATRIAL FIBRILLATION, UNSPECIFIED TYPE (CMD): ICD-10-CM

## 2019-03-12 LAB — INR PPP: 2.6

## 2019-03-19 ENCOUNTER — ANTI-COAG (OUTPATIENT)
Dept: CARDIOLOGY | Age: 84
End: 2019-03-19

## 2019-03-19 DIAGNOSIS — I48.91 ATRIAL FIBRILLATION, UNSPECIFIED TYPE (CMD): ICD-10-CM

## 2019-03-19 LAB — INR PPP: 2.9

## 2019-03-26 ENCOUNTER — ANTI-COAG (OUTPATIENT)
Dept: CARDIOLOGY | Age: 84
End: 2019-03-26

## 2019-03-26 DIAGNOSIS — I48.91 ATRIAL FIBRILLATION, UNSPECIFIED TYPE (CMD): ICD-10-CM

## 2019-03-26 LAB
INR PPP: 1.9
INR PPP: 2.9

## 2019-04-09 ENCOUNTER — ANTI-COAG (OUTPATIENT)
Dept: CARDIOLOGY | Age: 84
End: 2019-04-09

## 2019-04-09 DIAGNOSIS — I48.91 ATRIAL FIBRILLATION, UNSPECIFIED TYPE (CMD): ICD-10-CM

## 2019-04-09 LAB — INR PPP: 1.8

## 2019-04-16 ENCOUNTER — ANTI-COAG (OUTPATIENT)
Dept: CARDIOLOGY | Age: 84
End: 2019-04-16

## 2019-04-16 DIAGNOSIS — I48.91 ATRIAL FIBRILLATION, UNSPECIFIED TYPE (CMD): ICD-10-CM

## 2019-04-16 LAB — INR PPP: 2.3

## 2019-04-23 ENCOUNTER — ANTI-COAG (OUTPATIENT)
Dept: CARDIOLOGY | Age: 84
End: 2019-04-23

## 2019-04-23 DIAGNOSIS — I48.91 ATRIAL FIBRILLATION, UNSPECIFIED TYPE (CMD): ICD-10-CM

## 2019-04-23 LAB — INR PPP: 2.1

## 2019-07-22 PROBLEM — Z95.2 HISTORY OF MITRAL VALVE REPLACEMENT: Status: ACTIVE | Noted: 2019-01-01

## 2019-08-06 PROBLEM — G45.9 TIA (TRANSIENT ISCHEMIC ATTACK): Status: ACTIVE | Noted: 2019-01-01

## 2019-08-06 PROBLEM — I10 MALIGNANT HYPERTENSION: Status: ACTIVE | Noted: 2019-01-01

## 2019-08-06 PROBLEM — Z79.01 ANTICOAGULATED ON COUMADIN: Status: ACTIVE | Noted: 2019-01-01

## 2019-08-06 NOTE — ED INITIAL ASSESSMENT (HPI)
Wife reports just before 1600 pt had come in from doing some mild chores outside when his left side of his mouth began to droop and he said he didn't feel \"right\". Reports he had some L hand numbness at the time.  Wife states he remained oriented to all q

## 2019-08-07 NOTE — TELEPHONE ENCOUNTER
TIA CLINIC SCREENING    1. Date of ED visit/TIA diagnosis: 08/06/2019   Time of discharge from ED: 08/07/2019 at 0736 ED to floor    2. Is patient currently admitted? Yes   If YES, skip to #7 - TIA Clinic Appointment not required.     3. Does patient alrea

## 2019-08-07 NOTE — PLAN OF CARE
NURSING ADMISSION NOTE      Patient admitted via Cart  Oriented to room. Safety precautions initiated. Bed in low position. Call light in reach.     Received pt around 0000  VSS, NSR per Tele   A/Ox4, follows commands  no new neuro deficits noted  Ne

## 2019-08-07 NOTE — PLAN OF CARE
Received pt a/ox4, RA, NSR on tele at 0700  Neuros Q4, no deficits noted  Pt denies any pain at this time  Carotid u/s today  Elevated BP, notified Dr. Idalia Whitehead, torsemide and hydralazine added  Pt updated on plan of care and will continue to monitor    Pro

## 2019-08-07 NOTE — ED PROVIDER NOTES
Patient Seen in: [facility] Emergency Department    History   Patient presents with:  Stroke (neurologic)    Stated Complaint: suspected TIA this afternoon, left facial droop and numbness.  symptoms now comp*    HPI    80year-old presents to the jun signs reviewed. All other systems reviewed and negative except as noted above.     Physical Exam     ED Triage Vitals [08/06/19 5837]   BP (!) 217/85   Pulse 66   Resp 18   Temp 98.1 °F (36.7 °C)   Temp src Oral   SpO2 98 %   O2 Device None (Room air) ---------                               -----------         ------                     CBC W/ DIFFERENTIAL[[id number]]                              Final result                 Please view results for these tests on the individual orders.    RAINBOW inflammation. SKULL:             Right frontal approach ventriculostomy catheter     present.     OTHER:             None.         =====    CONCLUSION:  There has been slight interval decrease in dilatation of the     ventricles when compared with the pr head and there may be some areas of subdural fluid collections. Patient is allergic to IV contrast dye and therefore CTA cannot be done or CT IV contrast of the brain.   He also seems to have a shunt that can be adjusted with a magnet which makes me very c

## 2019-08-07 NOTE — PROGRESS NOTES
[facility]  Progress Note     Tianna Prince Patient Status:  Observation    1933 MRN [id number]   [facility] 7NE-A Attending Den Bolivar MD   Hosp Day # 0 PCP Unknown Pcp     Chief Complaint: left facial droop and left mg Oral Nightly   • Pravastatin Sodium  20 mg Oral Nightly       ASSESSMENT / PLAN:     1. Left facial droop, numbness- resolved within 5 minutes  1. ? TIA  2. CT reviewed, decreased size of ventricles after drain placement, ? SDH  3. Neurology and neurosur

## 2019-08-07 NOTE — CONSULTS
[facility]    Report of Consultation    Milton Preetarnie Patient Status:  Observation    1933 MRN [id number]   [facility] 7NE-A Attending Matthew Farah MD   Hosp Day # 0 PCP Unknown Pcp     Date of Admission:  2019  Date Disease Mother       reports that he quit smoking about 50 years ago. He has never used smokeless tobacco. He reports that he does not drink alcohol or use drugs.     Allergies:    Haldol [Haloperidol]        Comment:Becomes nasty  Radiology Contrast *  08/06/2019    CO2 28.0 08/06/2019    BUN 19 08/06/2019    CREATSERUM 1.02 08/06/2019    GLU 93 08/06/2019    CA 8.9 08/06/2019    ALKPHO 56 08/06/2019    ALT 35 08/06/2019    AST 35 08/06/2019    BILT 0.6 08/06/2019    ALB 3.6 08/06/2019    TP 6. fibrillation, on coumadin, subtherapeutic  HTN  History of NPH, s/p shunt    TIA event that localizes most likely to anterior circulation. Etiology considerations are now found R ICA stenosis, vs. Cardioembolic/afib (with subtherapeutic INR).  In terms of t

## 2019-08-07 NOTE — H&P
[facility]  History and Physical     Rogelio Nahid Fischer Patient Status:  Emergency    1933 MRN [id number]   Location [address] [facility] Attending Kristie Sparks MD   Hosp Day # 0 PCP Unknown Pcp     Chief Complaint: Left s & DEBRIDEMENT Left 10/29/2014    Performed by Constantine Rodriguez MD at [facility] CVOR   • APPENDECTOMY     • APPENDECTOMY     • HEART MITRAL VALVE REPAIR/REPLACEMENT N/A 8/22/2014    Performed by Philip Duque MD at [address] [place] [address], SURGICAL PROSTATECTOMY, R Chew 1 tablet by mouth daily. Disp:  Rfl:    Cholecalciferol (VITAMIN D3) 2000 UNITS Oral Tab Take 1 capsule by mouth Noon. Disp:  Rfl:    metoprolol Tartrate (LOPRESSOR) 25 MG Oral Tab Take 0.5 tablets by mouth 2x Daily(Beta Blocker).  (Patient taking diff Epic.      ASSESSMENT / PLAN:     1. Left facial droop, numbness  1. CT reviewed, ?inc in size of ventricles, ? SDH  2. MRI brain pending  3. Neurology and neurosurgery eval  2. NPH s/p shunt  3. Prior CVA  4. HTN urgency, BP improved  5.  PVD  6. DL, statin

## 2019-08-07 NOTE — ED NOTES
Placed clean pad on PT; Pt cleaned and dried.    Prior to going to the Floor, assisted Pt w/ use of urinal. Pt urinated 200 cc

## 2019-08-08 NOTE — PROGRESS NOTES
[facility]  Progress Note     Bayron Brown Patient Status:  Observation    1933 MRN [id number]   [facility] 7NE-A Attending Amadeo Hammer MD   Hosp Day # 0 PCP Unknown Pcp     Chief Complaint: left facial droop and left Epic.    Medications:   • [START ON 8/9/2019] lisinopril  40 mg Oral Daily   • aspirin  81 mg Oral Noon   • folic acid  325 mcg Oral Daily   • Warfarin Sodium  5 mg Oral Nightly   • Pravastatin Sodium  20 mg Oral Nightly   • torsemide  10 mg Oral Daily   •

## 2019-08-08 NOTE — PLAN OF CARE
Assumed care. Neuro remains intact. Patient BP remains high. Hydralazine x1 dose given for /71. When BP rechecked, BP higher at 195/66. Hospitalist notified and orders to restart Metoprolol and give additional dose of hydralazine now.  BP came down to

## 2019-08-08 NOTE — OCCUPATIONAL THERAPY NOTE
OCCUPATIONAL THERAPY QUICK EVALUATION - INPATIENT    Room Number: 7221/5450-B  Evaluation Date: 8/8/2019     Type of Evaluation: Quick Eval  Presenting Problem: TIA    Physician Order: IP Consult to Occupational Therapy  Reason for Therapy:  ADL/IADL Dysfu being monitored   • Dementia ([facility] [address] 5/8/2014    forgetfulness   • Esophageal reflux    • Mitral regurgitation    • Other and unspecified hyperlipidemia    • Peripheral vascular disease (HCC)    • Stroke [facility])     TIA   • TIA (transient ischemic attack)    • TOLERANCE                         O2 SATURATIONS                ACTIVITIES OF DAILY LIVING ASSESSMENT  AM-PAC ‘6-Clicks’ Inpatient Daily Activity Short Form   How much help from another person does the patient currently need…  -   Putting on and taking off occupational profile, problem-focused assessments, limited treatment options    Overall Complexity  LOW     OT Discharge Recommendations: Home       PLAN   Patient has been evaluated and presents with no skilled Occupational Therapy needs at this time.   Pa

## 2019-08-08 NOTE — PHYSICAL THERAPY NOTE
PHYSICAL THERAPY EVALUATION - INPATIENT     Room Number: 0740/6512-R  Evaluation Date: 8/8/2019  Type of Evaluation: Initial  Physician Order: PT Eval and Treat    Presenting Problem: TIA  Reason for Therapy: Mobility Dysfunction and Discharge Planni Lives With: Spouse     Patient Owned Equipment: Cane  Patient Regularly Uses: Glasses    Prior Level of [place]: Pt lives with spouse, ambulating without cane within home, using cane for community ambulation.  Pt goes to Cardiac Rehab maintenance a Supervision  Distance (ft): 150  Assistive Device: Cane  Pattern: Comment(inconsistent two point pattern)          Skilled Therapy Provided: Pt performed transfers, ambulation with SC with supervision. Pt provided cueing for step to sequencing with cane.  U

## 2019-08-08 NOTE — PLAN OF CARE
NURSING DISCHARGE NOTE    Discharged Home via Wheelchair. Accompanied by Spouse  Belongings Taken by patient/family. PIV and tele dc'd  Medication changes, follow up appointments, and DC instructions reviewed  Stroke education reinforced.  Stroke bind

## 2019-08-08 NOTE — PROGRESS NOTES
[address] Mon[facility] Castro Neurology Progress Note    [address]  Patient Status:  Observation    1933 MRN [id number]   [facility] 7NE-A Attending Poncho Gilbert MD   Hosp Day # 0 PCP Unknown Pcp     CC: Left sided numbness    Subject time  · 5 points of stroke education completed  · Bilateral SCDs in place for DVT prophylaxis  · Carotid US showed 50-69% stenosis to the right ICA  · Will need outpatient follow up with vascular surgery  · HTN  · Meds adjusted for better control  · A-fib:

## 2019-08-09 NOTE — DISCHARGE SUMMARY
[facility]OSPITAL[facility]  DISCHARGE SUMMARY     [address] [facility] Patient Status:  Observation    1933 MRN [id number]   [facility] [place] 7NE-A Attending No att. providers found   Hosp Day # 0 PCP Unknown Pcp     Date of Admission: 2019  Date o increased. He is recommended vascular surgery consult as an outpatient. He remained stable and was discharged home in good condition      Lace+ Score: 67  59-90 High Risk  29-58 Medium Risk  0-28   Low Risk. TCM Follow-Up Recommendation:  LACE > 58:  H MEQ Tbcr  Commonly known as:  K-DUR M20  Notes to patient:  Resume usual schedule      Take 20 mEq by mouth every other day. Refills:  0     Vitamin D3 2000 units Tabs      Take 1 capsule by mouth Noon.    Refills:  0     Warfarin Sodium 5 MG Tabs  Common guarding. Neurologic: No focal neurological deficits. Musculoskeletal: Moves all extremities. Extremities: No edema.   -----------------------------------------------------------------------------------------------  PATIENT DISCHARGE INSTRUCTIONS: See e

## 2019-08-13 NOTE — PROGRESS NOTES
[address] [facility] CLINIC      HISTORY   CHIEF COMPLAINT: post hospital follow up visit  HPI: Izaiah Vazquez is a 80year old male here today for follow up after being hospitalized for left facial droop and left sided numbnes aspirin 81 MG Oral Tab EC Take 1 tablet by mouth daily. (Patient taking differently: Take 81 mg by mouth Noon.) Disp: 30 tablet Rfl: 0   Multiple Vitamins-Minerals (CENTRUM SILVER) Oral Chew Tab Chew 1 tablet by mouth daily.  Disp:  Rfl:    Cholecalcifero 8/6/2019  CONCLUSION:  There has been slight interval decrease in dilatation of the ventricles when compared with the prior exam.  Interval placement of a right frontal approach ventriculostomy catheter.   Correlation with any other prior images would be he exertion  CARDIOVASCULAR: denies syncope, chest pain, fatigue, palpitations   GI: denies abdominal pain  MUSCULOSKELETAL: denies pain, numbness or tingling of extremeties, states normal range of motion of extremities  NEUROLOGIC: denies confusion  HEMATOLO 2.4    4. Mitral valve insufficiency, unspecified etiology    5. NPH (normal pressure hydrocephalus) (HCC)/s/p  shunt    6. H/O Cerebrovascular accident (CVA), unspecified mechanism ([facility] [address]    7. Mild cognitive impairment    8.  PVD (peripheral vascular dis 09/01/2019  LDL Control due on 08/06/2020  Annual Depression Screen due on 08/07/2020  Fall Risk Screening due on 08/08/2020    Transitional Care Management Certification:  During the visit, the following was completed:  ?  Obtained and reviewed discharge i

## 2019-08-13 NOTE — PROGRESS NOTES
TRANSITIONAL CARE CLINIC PHARMACIST MEDICATION RECONCILIATION        Elan Gilliland MRN [id number]    1933 PCP Unknown Pcp       Comments: Medication history completed in [address] [place] [address] by pharmacist with spouse.  Spouse brought all med hospital discharge. He is currently taking the torsemide 10 mg daily which was decreased from 20 mg daily. Also confirmed that he has stopped taking the benazepril and simvastatin.  Wife monitors blood pressure several times per day and brought in readings

## 2019-08-13 NOTE — PATIENT INSTRUCTIONS
PATIENT INSTRUCTIONS:   1. Start taking the torsemide in the morning and the lisinopril at bedtime   2. Take your blood pressure two times daily   BEFORE taking the torsemide   BEFORE taking the lisinopril  3.  Speak with Dr. Kade Clark (Neurology at [facility]

## 2020-01-01 ENCOUNTER — APPOINTMENT (OUTPATIENT)
Dept: CV DIAGNOSTICS | Facility: HOSPITAL | Age: 85
DRG: 193 | End: 2020-01-01
Attending: HOSPITALIST
Payer: MEDICARE

## 2020-01-01 ENCOUNTER — ANTI-COAG (OUTPATIENT)
Dept: CARDIOLOGY | Age: 85
End: 2020-01-01

## 2020-01-01 ENCOUNTER — TELEPHONE (OUTPATIENT)
Dept: CARDIOLOGY | Age: 85
End: 2020-01-01

## 2020-01-01 ENCOUNTER — EXTERNAL RECORD (OUTPATIENT)
Dept: HEALTH INFORMATION MANAGEMENT | Facility: OTHER | Age: 85
End: 2020-01-01

## 2020-01-01 ENCOUNTER — HOSPITAL ENCOUNTER (INPATIENT)
Facility: HOSPITAL | Age: 85
LOS: 1 days | DRG: 193 | End: 2020-01-01
Attending: EMERGENCY MEDICINE | Admitting: HOSPITALIST
Payer: MEDICARE

## 2020-01-01 ENCOUNTER — DOCUMENTATION (OUTPATIENT)
Dept: CARDIOLOGY | Age: 85
End: 2020-01-01

## 2020-01-01 ENCOUNTER — APPOINTMENT (OUTPATIENT)
Dept: GENERAL RADIOLOGY | Facility: HOSPITAL | Age: 85
DRG: 193 | End: 2020-01-01
Attending: HOSPITALIST
Payer: MEDICARE

## 2020-01-01 ENCOUNTER — APPOINTMENT (OUTPATIENT)
Dept: GENERAL RADIOLOGY | Facility: HOSPITAL | Age: 85
DRG: 193 | End: 2020-01-01
Attending: EMERGENCY MEDICINE
Payer: MEDICARE

## 2020-01-01 ENCOUNTER — OFFICE VISIT (OUTPATIENT)
Dept: CARDIOLOGY | Age: 85
End: 2020-01-01

## 2020-01-01 ENCOUNTER — APPOINTMENT (OUTPATIENT)
Dept: ULTRASOUND IMAGING | Facility: HOSPITAL | Age: 85
DRG: 193 | End: 2020-01-01
Attending: HOSPITALIST
Payer: MEDICARE

## 2020-01-01 VITALS
WEIGHT: 209 LBS | BODY MASS INDEX: 29.26 KG/M2 | HEART RATE: 62 BPM | HEIGHT: 71 IN | DIASTOLIC BLOOD PRESSURE: 70 MMHG | SYSTOLIC BLOOD PRESSURE: 146 MMHG

## 2020-01-01 VITALS
TEMPERATURE: 100 F | OXYGEN SATURATION: 78 % | WEIGHT: 211.88 LBS | HEIGHT: 70 IN | BODY MASS INDEX: 30.33 KG/M2 | DIASTOLIC BLOOD PRESSURE: 36 MMHG | SYSTOLIC BLOOD PRESSURE: 152 MMHG

## 2020-01-01 DIAGNOSIS — I10 HYPERTENSION, BENIGN: ICD-10-CM

## 2020-01-01 DIAGNOSIS — I48.91 ATRIAL FIBRILLATION, UNSPECIFIED TYPE (CMD): ICD-10-CM

## 2020-01-01 DIAGNOSIS — R53.83 FATIGUE, UNSPECIFIED TYPE: Primary | ICD-10-CM

## 2020-01-01 DIAGNOSIS — E78.00 PURE HYPERCHOLESTEROLEMIA: ICD-10-CM

## 2020-01-01 DIAGNOSIS — D64.9 ANEMIA, UNSPECIFIED TYPE: ICD-10-CM

## 2020-01-01 DIAGNOSIS — K92.2 GASTROINTESTINAL HEMORRHAGE, UNSPECIFIED GASTROINTESTINAL HEMORRHAGE TYPE: ICD-10-CM

## 2020-01-01 DIAGNOSIS — I48.91 ATRIAL FIBRILLATION, UNSPECIFIED TYPE (CMD): Primary | ICD-10-CM

## 2020-01-01 DIAGNOSIS — Z86.73 HISTORY OF TRANSIENT ISCHEMIC ATTACK (TIA): ICD-10-CM

## 2020-01-01 DIAGNOSIS — Z95.2 HISTORY OF MITRAL VALVE REPLACEMENT: ICD-10-CM

## 2020-01-01 DIAGNOSIS — Z20.822 CLOSE EXPOSURE TO COVID-19 VIRUS: ICD-10-CM

## 2020-01-01 DIAGNOSIS — R79.89 ELEVATED LFTS: ICD-10-CM

## 2020-01-01 LAB
INR PPP: 1.9 (ref 2–3)
INR PPP: 2 (ref 2–3)
INR PPP: 2.1
INR PPP: 2.2
INR PPP: 2.2 (ref 2–3)
INR PPP: 2.2 (ref 2–3)
INR PPP: 2.3
INR PPP: 2.3
INR PPP: 2.4
INR PPP: 2.4
INR PPP: 2.4 (ref 2–3)
INR PPP: 2.7
INR PPP: 3.1

## 2020-01-01 PROCEDURE — 99239 HOSP IP/OBS DSCHRG MGMT >30: CPT | Performed by: HOSPITALIST

## 2020-01-01 PROCEDURE — 99214 OFFICE O/P EST MOD 30 MIN: CPT | Performed by: INTERNAL MEDICINE

## 2020-01-01 PROCEDURE — 99223 1ST HOSP IP/OBS HIGH 75: CPT | Performed by: HOSPITALIST

## 2020-01-01 PROCEDURE — 71045 X-RAY EXAM CHEST 1 VIEW: CPT | Performed by: EMERGENCY MEDICINE

## 2020-01-01 PROCEDURE — 76770 US EXAM ABDO BACK WALL COMP: CPT | Performed by: HOSPITALIST

## 2020-01-01 PROCEDURE — 71045 X-RAY EXAM CHEST 1 VIEW: CPT | Performed by: HOSPITALIST

## 2020-01-01 RX ORDER — FUROSEMIDE 10 MG/ML
40 INJECTION INTRAMUSCULAR; INTRAVENOUS DAILY
Status: DISCONTINUED | OUTPATIENT
Start: 2020-01-01 | End: 2020-01-01

## 2020-01-01 RX ORDER — SODIUM CHLORIDE 9 MG/ML
1000 INJECTION, SOLUTION INTRAVENOUS ONCE
Status: COMPLETED | OUTPATIENT
Start: 2020-01-01 | End: 2020-01-01

## 2020-01-01 RX ORDER — SODIUM CHLORIDE 9 MG/ML
INJECTION, SOLUTION INTRAVENOUS CONTINUOUS
Status: ACTIVE | OUTPATIENT
Start: 2020-01-01 | End: 2020-01-01

## 2020-01-01 RX ORDER — SENNA AND DOCUSATE SODIUM 50; 8.6 MG/1; MG/1
2 TABLET, FILM COATED ORAL EVERY EVENING
COMMUNITY

## 2020-01-01 RX ORDER — BISACODYL 10 MG
10 SUPPOSITORY, RECTAL RECTAL DAILY PRN
COMMUNITY

## 2020-01-01 RX ORDER — SENNA AND DOCUSATE SODIUM 50; 8.6 MG/1; MG/1
2 TABLET, FILM COATED ORAL NIGHTLY
Status: DISCONTINUED | OUTPATIENT
Start: 2020-01-01 | End: 2020-01-01

## 2020-01-01 RX ORDER — FUROSEMIDE 10 MG/ML
40 INJECTION INTRAMUSCULAR; INTRAVENOUS ONCE
Status: COMPLETED | OUTPATIENT
Start: 2020-01-01 | End: 2020-01-01

## 2020-01-01 RX ORDER — MULTIVIT AND MINERALS-FERROUS GLUCONATE 9 MG IRON/15 ML ORAL LIQUID
5 DAILY
COMMUNITY

## 2020-01-01 RX ORDER — FUROSEMIDE 20 MG/1
20 TABLET ORAL DAILY
COMMUNITY

## 2020-01-01 RX ORDER — LEVETIRACETAM 100 MG/ML
250 SOLUTION ORAL 2 TIMES DAILY
COMMUNITY

## 2020-01-01 RX ORDER — SCOLOPAMINE TRANSDERMAL SYSTEM 1 MG/1
1 PATCH, EXTENDED RELEASE TRANSDERMAL
Status: DISCONTINUED | OUTPATIENT
Start: 2020-01-01 | End: 2020-01-01

## 2020-01-01 RX ORDER — HYDRALAZINE HYDROCHLORIDE 25 MG/1
75 TABLET, FILM COATED ORAL 3 TIMES DAILY
COMMUNITY

## 2020-01-01 RX ORDER — RUFINAMIDE 40 MG/ML
1 SUSPENSION ORAL DAILY
Status: DISCONTINUED | OUTPATIENT
Start: 2020-01-01 | End: 2020-01-01

## 2020-01-01 RX ORDER — FERROUS SULFATE 300 MG/5ML
300 LIQUID (ML) ORAL 2 TIMES DAILY
Status: DISCONTINUED | OUTPATIENT
Start: 2020-01-01 | End: 2020-01-01

## 2020-01-01 RX ORDER — WARFARIN SODIUM 5 MG/1
TABLET ORAL
Qty: 90 TABLET | Refills: 0 | Status: SHIPPED | OUTPATIENT
Start: 2020-01-01

## 2020-01-01 RX ORDER — LEVETIRACETAM 100 MG/ML
250 SOLUTION ORAL 2 TIMES DAILY
Status: DISCONTINUED | OUTPATIENT
Start: 2020-01-01 | End: 2020-01-01

## 2020-01-01 RX ORDER — FUROSEMIDE 10 MG/ML
20 INJECTION INTRAMUSCULAR; INTRAVENOUS ONCE
Status: COMPLETED | OUTPATIENT
Start: 2020-01-01 | End: 2020-01-01

## 2020-01-01 RX ORDER — CLONIDINE HYDROCHLORIDE 0.2 MG/1
0.2 TABLET ORAL 3 TIMES DAILY
Status: DISCONTINUED | OUTPATIENT
Start: 2020-01-01 | End: 2020-01-01

## 2020-01-01 RX ORDER — ENOXAPARIN SODIUM 100 MG/ML
40 INJECTION SUBCUTANEOUS DAILY
Status: DISCONTINUED | OUTPATIENT
Start: 2020-01-01 | End: 2020-01-01

## 2020-01-01 RX ORDER — WARFARIN SODIUM 5 MG/1
TABLET ORAL
Qty: 90 TABLET | Refills: 0 | Status: SHIPPED | OUTPATIENT
Start: 2020-01-01 | End: 2020-01-01

## 2020-01-01 RX ORDER — DONEPEZIL HYDROCHLORIDE 5 MG/1
5 TABLET, FILM COATED ORAL NIGHTLY
Status: DISCONTINUED | OUTPATIENT
Start: 2020-01-01 | End: 2020-01-01

## 2020-01-01 RX ORDER — DONEPEZIL HYDROCHLORIDE 5 MG/1
5 TABLET, FILM COATED ORAL NIGHTLY
COMMUNITY

## 2020-01-01 RX ORDER — ACETAMINOPHEN 325 MG/1
650 TABLET ORAL EVERY 6 HOURS PRN
Status: DISCONTINUED | OUTPATIENT
Start: 2020-01-01 | End: 2020-01-01

## 2020-01-01 RX ORDER — MIDAZOLAM HYDROCHLORIDE 2 MG/ML
325 SYRUP ORAL 2 TIMES DAILY
COMMUNITY

## 2020-01-01 RX ORDER — GLYCOPYRROLATE 0.2 MG/ML
0.2 INJECTION, SOLUTION INTRAMUSCULAR; INTRAVENOUS
Status: DISCONTINUED | OUTPATIENT
Start: 2020-01-01 | End: 2020-01-01

## 2020-01-01 RX ORDER — CLONIDINE HYDROCHLORIDE 0.2 MG/1
0.2 TABLET ORAL 3 TIMES DAILY
COMMUNITY

## 2020-01-01 SDOH — HEALTH STABILITY: PHYSICAL HEALTH: ON AVERAGE, HOW MANY MINUTES DO YOU ENGAGE IN EXERCISE AT THIS LEVEL?: 30 MIN

## 2020-01-01 SDOH — HEALTH STABILITY: PHYSICAL HEALTH: ON AVERAGE, HOW MANY DAYS PER WEEK DO YOU ENGAGE IN MODERATE TO STRENUOUS EXERCISE (LIKE A BRISK WALK)?: 5 DAYS

## 2020-01-01 ASSESSMENT — PATIENT HEALTH QUESTIONNAIRE - PHQ9
2. FEELING DOWN, DEPRESSED OR HOPELESS: NOT AT ALL
SUM OF ALL RESPONSES TO PHQ9 QUESTIONS 1 AND 2: 0
1. LITTLE INTEREST OR PLEASURE IN DOING THINGS: NOT AT ALL
SUM OF ALL RESPONSES TO PHQ9 QUESTIONS 1 AND 2: 0

## 2020-01-01 ASSESSMENT — ENCOUNTER SYMPTOMS
WEIGHT LOSS: 0
FEVER: 0
WEIGHT GAIN: 0
COUGH: 0
HEMATOCHEZIA: 0
HEMOPTYSIS: 0
CHILLS: 0
ALLERGIC/IMMUNOLOGIC COMMENTS: NO NEW FOOD ALLERGIES
BRUISES/BLEEDS EASILY: 0
SUSPICIOUS LESIONS: 0

## 2020-04-29 PROBLEM — K92.2 GASTROINTESTINAL HEMORRHAGE, UNSPECIFIED GASTROINTESTINAL HEMORRHAGE TYPE: Status: ACTIVE | Noted: 2020-01-01

## 2020-04-29 PROBLEM — D64.9 ANEMIA, UNSPECIFIED TYPE: Status: ACTIVE | Noted: 2020-01-01

## 2020-04-29 PROBLEM — R79.89 AZOTEMIA: Status: ACTIVE | Noted: 2020-01-01

## 2020-04-29 PROBLEM — R79.89 ELEVATED LFTS: Status: ACTIVE | Noted: 2020-01-01

## 2020-04-29 PROBLEM — E87.0 HYPERNATREMIA: Status: ACTIVE | Noted: 2020-01-01

## 2020-04-29 PROBLEM — Z20.822 CLOSE EXPOSURE TO COVID-19 VIRUS: Status: ACTIVE | Noted: 2020-01-01

## 2020-04-29 PROBLEM — R53.83 FATIGUE, UNSPECIFIED TYPE: Status: ACTIVE | Noted: 2020-01-01

## 2020-04-29 PROBLEM — D64.9 ANEMIA: Status: ACTIVE | Noted: 2020-01-01

## 2020-04-29 PROBLEM — R53.83 FATIGUE: Status: ACTIVE | Noted: 2020-01-01

## 2020-04-29 NOTE — ED NOTES
Room changed to 372 (dirty), called Sybil Every to confirm she will still be taking Pt as I gave her report, Updated daughter over phone.

## 2020-04-29 NOTE — ED NOTES
Pt asleep, aroused when placing BP cuff, pt asked for nurse to leave him alone, explained what was going on and what we were doing, Pt grunted and went back to sleep.

## 2020-04-29 NOTE — ED NOTES
[facility] home called, reports the patient's Liver enzymes have doubled, very lethargic, bedside ultrasound clear. 1st case of COVID yesterday. On the same floor as the patient.   ER MD updated

## 2020-04-29 NOTE — ED NOTES
Report will be given to KATHY Agarwal @ [id number], in a patient room, will call again in a few minutes.

## 2020-04-29 NOTE — ED INITIAL ASSESSMENT (HPI)
Patient to ED from [facility]. Reports he is here for abnormal results. Elevated LFT's and low hemoglobin. Patient is A/Ox1 to norm per paramedics.  Patient moaning in room, attempts to state last name

## 2020-04-29 NOTE — ED PROVIDER NOTES
Patient Seen in: [facility] Emergency Department      History   Patient presents with:  Abnormal Result    Stated Complaint: abnormal labs    HPI    Patient is a 24-year-old male presents emergency room with a history of being a resident Domonique Marin APPENDECTOMY     • HEART MITRAL VALVE REPAIR/REPLACEMENT N/A 8/22/2014    Performed by Zaira Chambers MD at [address] [place] [address]., SURGICAL PROSTATECTOMY, RETROPUBIC RADICAL, W/NERVE SPARING     • OTHER       shunt   • OTHER SURGICAL HISTORY  hernia rep anywhere throughout the abdomen. There is no guarding, no rebound, no mass, and no organomegaly appreciated. There is normoactive bowel sounds. There is no hernia. GENITOURINARY: Patient has indwelling Conde catheter in place.   Patient has some mild symme - Abnormal; Notable for the following components:     (*)     All other components within normal limits   C-REACTIVE PROTEIN - Abnormal; Notable for the following components:    C-Reactive Protein 10.80 (*)     All other components within normal galeano Please view results for these tests on the individual orders.    ABORH (BLOOD TYPE)   ANTIBODY SCREEN   RAINBOW DRAW BLUE   RAINBOW DRAW LAVENDER   RAINBOW DRAW LIGHT GREEN   RAINBOW DRAW GOLD   BLOOD CULTURE   BLOOD CULTURE   URINE CULTURE, ROUTINE   S Medication List                       Present on Admission  Date Reviewed: 8/13/2019          ICD-10-CM Noted POA    Anemia D64.9 4/29/2020 Yes    Azotemia R79.89 4/29/2020 Yes    Fatigue R53.83 4/29/2020 Unknown    Hypernatremia E87.0 4/29/2020 Yes

## 2020-04-29 NOTE — H&P
[facility] HOSPITALIST  History and Physical     [place] [facility] Patient Status:  Emergency    1933 MRN [id number]   Location [facility] Attending Simone Elise MD   Hosp Day # 0 PCP Ledy Lindsay MD     Chief Complain years ago. He has never used smokeless tobacco. He reports that he does not drink alcohol or use drugs.     Family History:   Family History   Problem Relation Age of Onset   • Heart Disorder Father    • Heart Disease Father    • Hypertension Mother    • He Normocephalic atraumatic. Moist mucous membranes. EOM-I. PERRLA. Anicteric. Neck: No lymphadenopathy. No JVD. No carotid bruits. Respiratory: basilar crackles b/l,  No wheezes. No rhonchi. Cardiovascular: S1, S2. Regular rate and rhythm.  No murmurs, rub TIA  14.  PVD    Quality:  · DVT Prophylaxis: lovenox  · CODE status: DNR  · Conde: no    Plan of care discussed with pt, rn    Franco Conley MD  4/29/2020

## 2020-04-29 NOTE — PROGRESS NOTES
NURSING ADMISSION NOTE      Patient admitted via Cart  Oriented to room. Safety precautions initiated. Bed in low position. Call light in reach. Pt received on floor. AOx1. RA. Amaya intact. Maira intact. Navigator completed. Pt resting. Wctm. Patient picked up: prescription.   Identity was verified: Yes.

## 2020-04-29 NOTE — ED NOTES
Patient's Daughter Wily Mccoy was called and given an update on the patient and that he will be admitted to the hospital.  [phone]

## 2020-04-30 NOTE — PLAN OF CARE
Alert to self, baseline. 2LNC, weaning as tolerated. Chronic lowe in for retention. Total lift. Meds through gtube, no residual noted. Seizure precautions in place. IV abx. No n/v/d. Denies any signs of pain or respiratory distress at this time.  Will mon

## 2020-04-30 NOTE — PROGRESS NOTES
Pt found with no pulse or respirations, confirmed at bedside with Dr. Leela Mclean, time of death 5.

## 2020-04-30 NOTE — CM/SW NOTE
Patient comes from [address] [place] [address] and is being has been tested for COVID-19. This patient's Rapid  COVID-19 test is Negative but COVID PCR test  is pending. At the time of this note, there are  known positive cases in that facility.     Lady Thayer

## 2020-04-30 NOTE — CM/SW NOTE
04/30/20 1000   CM/SW Referral Data   Referral Source Physician;Social Work (self-referral)   Reason for Referral Discharge planning   Informant Spouse; Other  ([address] Staff)   Patient [facility]

## 2020-04-30 NOTE — CONSULTS
[address] [facility] Patient Status:  Inpatient    1933 MRN [id number]   [facility] 3SW-A Attending Zuleima Teran MD   [facility] Day # 1 PCP Andrés York MD     Date of Admission: 2020  1:05 PM  Admission Diagnosis: SURGICAL HISTORY      8/2014, MVR, PV ALBATION DR. DIANE Hampton [Haloperidol]        Comment:Becomes nasty  Radiology Contrast *        Comment:hives     Social History:   reports that he quit smoking about 51 years ago.  He has never used smokeless to Tube, Daily  •  Senna-Docusate Sodium (SENOKOT S) 8.6-50 MG tab 2 tablet, 2 tablet, Per G Tube, Nightly  •  Enoxaparin Sodium (LOVENOX) 40 MG/0.4ML injection 40 mg, 40 mg, Subcutaneous, Daily  •  acetaminophen (TYLENOL) tab 650 mg, 650 mg, Oral, Q6H PRN  • non-tender, non-distended, positive BS.                         Extremity: No clubbing or cyanosis. ++ edema                          Skin: No rashes or lesions.        Lab Results   Component Value Date    WBC 12.5 04/29/2020    RBC 3.12 04/29/2020    HGB saturating in the low 80's  -lasix 40mg IVP now  -echo  -on ceftriaxone  -discussed with POA over the phone as well as wife, they confirmed DNR/DNI status.   Patient is currently tachypneic in the 40's and appears uncomfortable, family would like to focus o

## 2020-05-01 ENCOUNTER — TELEPHONE (OUTPATIENT)
Dept: CARDIOLOGY | Age: 85
End: 2020-05-01

## 2021-02-22 ENCOUNTER — ANTI-COAG (OUTPATIENT)
Dept: CARDIOLOGY | Age: 86
End: 2021-02-22

## (undated) NOTE — Clinical Note
Patient Name: Kemi Parikh  YOB: 1933          MRN number:  [id number]  Date:  1/9/2017  Referring Physician:  Adolfo Jenkins         FALL SCREEN EVALUATION    Referring Physician: Ms. Adolfo Jenkins APN-FNP Diagnosis: Normal Pre Precautions:  None    OBJECTIVE:   Physical Exam:  Posture/Observation: Mild forward lean through his hips. Cervical spine AROM: Functionally bilaterally. No increase in pain. LE Strength: >4+/5 for all planes assessed of the LEs.     LE Flexibility: (0)  Severe Impairment: Cannot walk 20’ without assistance, severe gait deviations or imbalance. 2. Change in gait speed: 2  Grading: Wilver the lowest category that applies.   (3)  Normal: Able to smoothly change walking speed without loss of balance or (0) Severe Impairment: Preforms task with severe disruption of gait, i.e., staggers outside 15” path, loses balance, stops, reaches for wall. Today's treatment: Evaluation followed by patient education provided on 1/9/2017.  Patient was instructed in and via fax as soon as possible to [phone].  If you have any questions, please contact me at Dept: [phone]    Sincerely,  Electronically signed by therapist: Thu Alston, RICHARD    Physician's certification required: Yes  I certify the need for these serv

## (undated) NOTE — Clinical Note
Patient Name: Bayron Brown  YOB: 1933          MRN number:  [id number]  Date:  2/20/2017  Referring Physician:  Irene Connor     Dear Dr. Erwin Dumont:     Your patient Steve Lazo has received 4 Physical Therapy sessions for an exercise a

## (undated) NOTE — MR AVS SNAPSHOT
[address]               Thank you for choosing us for your health care visit with brenda ayoub PT. We are glad to serve you and happy to provide you with this summary of your visit.   Ple Take 1 tablet by mouth daily. What changed:  when to take this           Benazepril HCl 20 MG Tabs   Take 1 tablet by mouth daily.    What changed:  how much to take   Commonly known as:  LOTENSIN           folic acid 271 MCG Tabs   Take 400 mcg by mouth Visit [facility] online at  [email or web address]

## (undated) NOTE — LETTER
Patient Name: Elan Gilliland  YOB: 1933          MRN number:  [id number]  Date:  10/12/2018  Referring Physician:  [address] [place] [address] EVALUATION    Referring Physician: Dr. Sandra Avina.    Diagnosis: Gait and Balance Dist lean through the trunk and pelvis. LE Strength: Strength is >4/5 at the bilateral hips, knees, and ankles. LE Flexibility: Moderate loss in hamstring and rectus femoris flexibility.                Postural Control:  4-Stage Balance Test:   - Feet toge (3)  Normal: Able to smoothly change walking speed without loss of balance or gait deviation. Shows a significant difference in walking speed between normal, fast and slow speeds.    (2)  Mild Impairment: Is able to change speed but demonstrates mild gait d Today's treatment: Evaluation followed by patient education provided on 10/12/2018.  Patient was instructed in and issued a HEP for: trunk and hip mobility: PT Octaviaal Low Complexity, TherAct 1      Total Timed Treatment: 45 min     Total Treatment Time: 45 mi via fax as soon as possible to [phone].  If you have any questions, please contact me at Dept: [phone]    Sincerely,  Electronically signed by therapist: Polly English PT    Physician's certification required: Yes  I certify the need for these serv

## (undated) NOTE — MR AVS SNAPSHOT
[address]               Thank you for choosing us for your health care visit with brenda ayoub PT. We are glad to serve you and happy to provide you with this summary of your visit.   Ple This list is accurate as of: 1/9/17  2:48 PM.  Always use your most recent med list.                aspirin 81 MG Tbec   Take 1 tablet by mouth daily. What changed:  when to take this           Benazepril HCl 20 MG Tabs   Take 1 tablet by mouth daily. MyChart questions? Call [phone] for help. [employer] is NOT to be used for urgent needs. For medical emergencies, dial 911.         Educational Information     Healthy Diet and Regular Exercise  [employer] [facility] [address] [place] [address] [employer]

## (undated) NOTE — Clinical Note
Your patient was recently seen at the [facility] for a hospital follow-up visit. The visit note is attached. Please contact the clinic with any questions at [phone].     Thank you,  MARLENY Vivas

## (undated) NOTE — Clinical Note
Patient Name: Izaiah Vazquez  YOB: 1933          MRN number:  [id number]  Date:  4/3/2017  Referring Physician:  Vickie Key    Dear Dr. Qureshi Backbone,    This letter is to inform you of  Reginaldo Gil progress in speech therapy.     Progr Short Term Goals:  1. Patient will complete visuospatial, planning and organizational tasks with 80% accuracy given min verbal cues. Planin% during pill sorting activity , improving to 100% given min-mod cues to plan out a routine.  He continues to n treatment options and has agreed to actively participate in planning and for this course of care. Thank you for your referral. Please co-sign or sign and return this letter via fax as soon as possible to [phone].  If you have any questions, please c

## (undated) NOTE — MR AVS SNAPSHOT
[address]               Thank you for choosing us for your health care visit with brenda ayoub PT. We are glad to serve you and happy to provide you with this summary of your visit.   Ple Take 20 mg by mouth daily. Indications: alternating 10 mg and 20 mg every other day   Commonly known as:  DEMADEX           Vitamin D3 2000 units Tabs   Take 1 capsule by mouth Noon. Warfarin Sodium 5 MG Tabs   Take 5 mg by mouth nightly.    Commo

## (undated) NOTE — Clinical Note
EDI, TCM call made, see notes.  Donaldo Mackey patient's wife, confirms patient has a HFU at the [facility] clinic on 8/13/19, TRAVIS changed visit type to TCM HFU

## (undated) NOTE — MR AVS SNAPSHOT
[address]               Thank you for choosing us for your health care visit with brenda ayoub PT. We are glad to serve you and happy to provide you with this summary of your visit.   Ple folic acid 490 MCG Tabs   Take 400 mcg by mouth daily. Commonly known as:  FOLVITE           metoprolol Tartrate 25 MG Tabs   Take 0.5 tablets by mouth 2x Daily(Beta Blocker).    What changed:    - how much to take  - when to take this   Commonly known a

## (undated) NOTE — Clinical Note
Patient Name: Jonelle Bates  YOB: 1933          MRN number:  [id number]  Date:  2/1/2017  Referring Physician:  Kenna Haque         ADULT SPEECH/LANGUAGE EVALUATION  Referring Physician: IZABEL Carter  Diagnosis: Cogn • Esophageal reflux    • Visual impairment    • Stroke [facility])      TIA   • Arrhythmia      AFIB, cardioversion 3 wks prior   • Dementia 5/8/2014     forgetfulness       Current Outpatient Prescriptions:  Multiple Vitamins-Minerals (PRESERVISION AREDS) Oral impairments to ensure safe and independent ability to participate in all ADL activities in his home and community environments.       Precautions:  Fall Risk    OBJECTIVE:     COGNITIVE-COMMUNICATIVE SKILLS  Assessment tools used: Cognitive-Linguistic Quick increase independence, safety, and success in home and community environments. Short Term Goals:  1. Continue assessing expressive and receptive language, reading comprehension, and problem solving.    2. Patient will complete visuospatial, planning and